# Patient Record
Sex: MALE | Race: WHITE | Employment: STUDENT | URBAN - METROPOLITAN AREA
[De-identification: names, ages, dates, MRNs, and addresses within clinical notes are randomized per-mention and may not be internally consistent; named-entity substitution may affect disease eponyms.]

---

## 2021-04-09 ENCOUNTER — TELEPHONE (OUTPATIENT)
Dept: NEUROLOGY | Facility: CLINIC | Age: 21
End: 2021-04-09

## 2021-04-09 ENCOUNTER — OFFICE VISIT (OUTPATIENT)
Dept: NEUROLOGY | Facility: CLINIC | Age: 21
End: 2021-04-09
Payer: COMMERCIAL

## 2021-04-09 VITALS
DIASTOLIC BLOOD PRESSURE: 72 MMHG | BODY MASS INDEX: 25.9 KG/M2 | WEIGHT: 185 LBS | HEIGHT: 71 IN | SYSTOLIC BLOOD PRESSURE: 118 MMHG

## 2021-04-09 DIAGNOSIS — M54.41 ACUTE RIGHT-SIDED LOW BACK PAIN WITH RIGHT-SIDED SCIATICA: ICD-10-CM

## 2021-04-09 DIAGNOSIS — M54.16 LUMBAR RADICULOPATHY: Primary | ICD-10-CM

## 2021-04-09 PROCEDURE — 3008F BODY MASS INDEX DOCD: CPT | Performed by: PHYSICAL MEDICINE & REHABILITATION

## 2021-04-09 PROCEDURE — 3078F DIAST BP <80 MM HG: CPT | Performed by: PHYSICAL MEDICINE & REHABILITATION

## 2021-04-09 PROCEDURE — 99204 OFFICE O/P NEW MOD 45 MIN: CPT | Performed by: PHYSICAL MEDICINE & REHABILITATION

## 2021-04-09 PROCEDURE — 3074F SYST BP LT 130 MM HG: CPT | Performed by: PHYSICAL MEDICINE & REHABILITATION

## 2021-04-09 RX ORDER — METHYLPREDNISOLONE 4 MG/1
TABLET ORAL
Qty: 1 PACKAGE | Refills: 0 | Status: SHIPPED | OUTPATIENT
Start: 2021-04-09

## 2021-04-09 NOTE — PATIENT INSTRUCTIONS
Plan  He will get lumbar spine flexion and extension x-rays. He will get a MRI of the lumbar spine. He will take a Medrol Dosepak. He will get into the Pt for the lumbar spine. He will let me know how he is doing next week.     He will follow

## 2021-04-09 NOTE — PROGRESS NOTES
Low Back Pain H & P    Chief Complaint:  Patient presents with:  New Patient: Lower right side back pain along with hip. Patient reports numbness on the right leg. pain is a 7/10. patient states that this pain started on wednesday . Denies injury. No meds. History      Not on file    Tobacco Use      Smoking status: Not on file    Substance and Sexual Activity      Alcohol use: Not on file      Drug use: Not on file      Sexual activity: Not on file    Other Topics      Concerns:        Not on file    Social allergies. Gait:   The patient has no difficulty walking. PE:  The patient does appear in his stated age in mild distress. The patient is well groomed. Psychiatric:  The patient is alert and oriented x 3.   The patient has a normal affect and mo test Negative for pain   LEFT hip ELIZABETH test Negative for pain   RIGHT hip internal rotation Negative for pain   LEFT hip internal rotation Negative for pain   RIGHT hip piriformis stretch test Negative for pain   LEFT hip piriformis stretch test Negative

## 2021-04-09 NOTE — TELEPHONE ENCOUNTER
[employer] for authorization of approval for MRI L-spine wo cpt code 92970. Authorization is not required. Transaction ID: [id number]. Pt. Informed.  Transferred call to scheduling for appt,

## 2021-04-13 ENCOUNTER — HOSPITAL ENCOUNTER (OUTPATIENT)
Dept: MRI IMAGING | Facility: HOSPITAL | Age: 21
Discharge: HOME | End: 2021-04-13
Attending: PHYSICAL MEDICINE & REHABILITATION
Payer: COMMERCIAL

## 2021-04-13 ENCOUNTER — HOSPITAL ENCOUNTER (OUTPATIENT)
Dept: GENERAL RADIOLOGY | Facility: HOSPITAL | Age: 21
Discharge: HOME | End: 2021-04-13
Attending: PHYSICAL MEDICINE & REHABILITATION
Payer: COMMERCIAL

## 2021-04-13 DIAGNOSIS — M54.41 ACUTE RIGHT-SIDED LOW BACK PAIN WITH RIGHT-SIDED SCIATICA: ICD-10-CM

## 2021-04-13 DIAGNOSIS — M54.16 LUMBAR RADICULOPATHY: ICD-10-CM

## 2021-04-13 PROCEDURE — 72110 X-RAY EXAM L-2 SPINE 4/>VWS: CPT | Performed by: PHYSICAL MEDICINE & REHABILITATION

## 2021-04-13 PROCEDURE — 72148 MRI LUMBAR SPINE W/O DYE: CPT | Performed by: PHYSICAL MEDICINE & REHABILITATION

## 2021-04-18 PROBLEM — M51.26 LUMBAR HERNIATED DISC: Status: ACTIVE | Noted: 2021-04-18

## 2021-04-18 PROBLEM — M48.061 SPINAL STENOSIS OF LUMBAR REGION WITHOUT NEUROGENIC CLAUDICATION: Status: ACTIVE | Noted: 2021-04-18

## 2021-04-18 PROBLEM — M51.9 LUMBAR DISC DISEASE: Status: ACTIVE | Noted: 2021-04-18

## 2021-05-25 ENCOUNTER — MED REC SCAN ONLY (OUTPATIENT)
Dept: NEUROLOGY | Facility: CLINIC | Age: 21
End: 2021-05-25

## 2021-07-07 ENCOUNTER — MED REC SCAN ONLY (OUTPATIENT)
Dept: NEUROLOGY | Facility: CLINIC | Age: 21
End: 2021-07-07

## 2021-08-30 ENCOUNTER — TELEPHONE (OUTPATIENT)
Dept: NEUROLOGY | Facility: CLINIC | Age: 21
End: 2021-08-30

## 2021-08-30 RX ORDER — IVERMECTIN 3 MG/1
TABLET ORAL
Qty: 55 TABLET | Refills: 0 | Status: SHIPPED | OUTPATIENT
Start: 2021-08-30

## 2023-05-16 ENCOUNTER — TELEPHONE (OUTPATIENT)
Dept: PHYSICAL MEDICINE AND REHAB | Facility: CLINIC | Age: 23
End: 2023-05-16

## 2023-05-16 DIAGNOSIS — M51.9 LUMBAR DISC DISEASE: ICD-10-CM

## 2023-05-16 DIAGNOSIS — M54.16 LUMBAR RADICULOPATHY: Primary | ICD-10-CM

## 2023-05-16 DIAGNOSIS — M51.26 LUMBAR HERNIATED DISC: ICD-10-CM

## 2023-05-16 DIAGNOSIS — M48.061 SPINAL STENOSIS OF LUMBAR REGION WITHOUT NEUROGENIC CLAUDICATION: ICD-10-CM

## 2023-05-16 NOTE — TELEPHONE ENCOUNTER
Patient was kneed in the left calf about one month ago or longer and has continued to have calf pain. On examination, there is weakness of the left hamstring. Left L5-S1 radiculopathy. He will get back into the PT for the lumbar spine. Please fax his PT order to [facility] and then notify the patient that the order has been sent. He will need to follow up with me in 4-6 weeks in the office.

## 2023-05-17 NOTE — TELEPHONE ENCOUNTER
PT order has been faxed via [employer] [facility] fax #: [phone]. LM on patient's VM informing him of the above. Nothing further needed at this time.

## 2025-01-15 ENCOUNTER — TELEPHONE (OUTPATIENT)
Dept: PHYSICAL MEDICINE AND REHAB | Facility: CLINIC | Age: 25
End: 2025-01-15

## 2025-01-15 ENCOUNTER — OFFICE VISIT (OUTPATIENT)
Dept: PHYSICAL MEDICINE AND REHAB | Facility: CLINIC | Age: 25
End: 2025-01-15
Payer: COMMERCIAL

## 2025-01-15 VITALS — BODY MASS INDEX: 28.7 KG/M2 | HEIGHT: 71 IN | WEIGHT: 205 LBS

## 2025-01-15 DIAGNOSIS — T14.8XXA MUSCLE TEAR: Primary | ICD-10-CM

## 2025-01-15 PROCEDURE — 3008F BODY MASS INDEX DOCD: CPT | Performed by: PHYSICAL MEDICINE & REHABILITATION

## 2025-01-15 PROCEDURE — 99204 OFFICE O/P NEW MOD 45 MIN: CPT | Performed by: PHYSICAL MEDICINE & REHABILITATION

## 2025-01-15 PROCEDURE — 76881 US COMPL JOINT R-T W/IMG: CPT | Performed by: PHYSICAL MEDICINE & REHABILITATION

## 2025-01-15 NOTE — TELEPHONE ENCOUNTER
LVMTCB and MCM sent to add on to schedule today 1/15/25 at 11am, okay to double book per Dr. Johnson request. Please schedule accordingly if pt calls back.

## 2025-01-17 ENCOUNTER — TELEPHONE (OUTPATIENT)
Dept: PHYSICAL MEDICINE AND REHAB | Facility: CLINIC | Age: 25
End: 2025-01-17

## 2025-01-17 PROBLEM — T14.8XXA MUSCLE TEAR: Status: ACTIVE | Noted: 2025-01-17

## 2025-01-17 NOTE — TELEPHONE ENCOUNTER
Please call this patient on Monday and get the fax number for his PT and also give him the number for the new sports medicine orthopedic surgeon, Dr. Terry Haynes.

## 2025-01-17 NOTE — PROCEDURES
Diagnostic Ultrasound of the left chest wall and humerus with attention to the pectoralis major muscle (comparison was made to the right side):  The humerus is intact on both sides.  The long head of the biceps tendon was intact on both sides.  On the right side we attachment of the pectoralis major tendon to the humerus was intact on the left side the attachment of the pectoralis major tendon to the humerus was very thin on axial view.  On transverse, view the pectoralis major muscle and the inferior aspect of the muscle balled up and did not attached to the humerus which was not the case on the right side.  On the superior aspect of the pectoralis major tendon on the left side, there appeared to be some scarring or weakness tendon attachments to the humerus.  The tendon was fully intact on the right side with complete attachment to the humeral head on transverse view.    The ultrasound images were saved to the ultrasound unit will be transferred to the PACS system at a later date.

## 2025-01-17 NOTE — PROGRESS NOTES
Cervical Pain H & P    Chief Complaint:    Chief Complaint   Patient presents with    New Patient     LOV 4/9/21 New patient is here with complaints of a torn lees , injury happened in September due to playing football. No N/T. Not taking any pain meds or muscle relaxer's. No recent imaging .  Reports having constant weakness . Pain 1/10       HPI:  Jean Yarbrough is a 24 year old year old right handed male without a prior history of left arm pain.  The pain started in early September when he was playing flag football on Labor Day and was tackled with his left arm fully abducted.  The left arm had a hyperextension moment at the shoulder when he hit the ground.  He developed pain in the left shoulder/chest area with bruising.  He did not seek any health care for this.  He has noticed that he has significant left arm/shoulder weakness when doing a push up or bench pressing.  He is only able to do at most 5 repetitions.  He is seeing me today to see if there is anything else that can be done for this.  He denies any other weakness and denies numbness and tingling.    Past Medical History   History reviewed. No pertinent past medical history.    Past Surgical History   History reviewed. No pertinent surgical history.    Family History   History reviewed. No pertinent family history.    Social History   Social History     Socioeconomic History    Marital status: Single     Spouse name: Not on file    Number of children: Not on file    Years of education: Not on file    Highest education level: Not on file   Occupational History    Not on file   Tobacco Use    Smoking status: Never     Passive exposure: Never    Smokeless tobacco: Never   Vaping Use    Vaping status: Never Used   Substance and Sexual Activity    Alcohol use: Never    Drug use: Never    Sexual activity: Not on file   Other Topics Concern    Caffeine Concern Yes    Exercise Yes    Seat Belt Not Asked    Special Diet Not Asked    Stress Concern Not Asked     Weight Concern Not Asked   Social History Narrative    Not on file     Social Drivers of Health     Financial Resource Strain: Not on file   Food Insecurity: Not on file   Transportation Needs: Not on file   Physical Activity: Not on file   Stress: Not on file   Social Connections: Not on file   Housing Stability: Not on file       Review of Systems  Patient-reported ROS  Constitutional  Sleep Disturbance: denies  Chills: denies  Fever: denies  Weight Gain: denies  Weight Loss: denies   Cardiovascular  Chest Pain: denies  Irregular Heartbeat: denies   Respiratory  Painful Breathing: denies  Wheezing: denies   Gastrointestinal  Bowel Incontinence: denies  Heartburn: denies  Abdominal Pain: denies  Blood in Stool : denies  Rectal Pain: denies   Hematology  Easy Bruising: denies  Easy Bleeding: denies   Genitourinary  Difficulty Urinating: denies  Bladder Incontinence: denies  Pelvic Pain: denies  Painful Urination: denies   Musculoskeletal  Joint Stiffness: denies  Painful Joints: denies  Tailbone Pain: denies  Swollen Joints: denies   Peripheral Vascular  Swelling of Legs/Feet: denies  Cold Extremities: denies   Skin  Open Sores: denies  Nodules or Lumps: denies  Rash: denies   Neurological  Loss of Strength Since last Visit: admits  Tingling/Numbness: denies  Balance: admits   Psychiatric  Anxiety: denies  Depressed Mood: denies        PE:  The patient does appear in his stated age in no distress.  The patient is well groomed.    Psychiatric:  The patient is alert and oriented x 3.  The patient has a normal affect and mood.      Respiratory:  No acute respiratory distress. Patient does not have a cough.    HEENT:  Extraocular muscles are intact. There is no kern icterus. Pupils are equal, round, and reactive to light. No redness or discharge bilaterally.    Skin:  There are no rashes or lesions.    Lymph Nodes:  The patient has no palpable submandibular, supraclavicular, and cervical lymph nodes..    Vitals:  There  were no vitals filed for this visit.    Cervical Spine:    Posture: normal chin forward superiorly rotated protracted shoulder posture.   Shoulders: Level   Head: In neutral     Vascular upper extremity:   Right radial pulses: 2+   Left radial pulses: 2+     Neurological Upper Extremity:    Light Touch: Intact in Bilateral upper extremities.   Pin Prick: Not tested.   UE Muscle Strength: All Upper Extremity strength measurements 5/5   Reflexes: 2+ In the bilateral upper extremities.   Joyce's sign Right: Negative   Joyce's sigh Left: Negative     Shoulder: The shoulders are stable.    Medial Border Scapular Winging: absent   Right Scapula: normal alignment   Left Scapula: normal alignment   Palpation: Non-tender shoulder palpations.   Right Internal Rotation: normal   Left Internal Rotation: normal   Right External Rotation: normal   Left External Rotation: normal   Shoulder Special Tests: Right and left Alcala test negative.   Palpation of the tendon of the pectoralis major muscles on the left reveals absence of the inferior fibers at a minimum.  Activation of the pectoralis major muscles reveals the same as palpation.  These findings are not present on the right side.    Assessment    ICD-10-CM    1. Muscle tear of the left pectoralis major muscle  T14.8XXA         Plan  I did a diagnostic ultrasound of the left chest wall and humerus with attention to the pectoralis major muscle. (See procedure note)    He will get a MRI of the chest with attention to the pectoral muscles.    He will get into PT for the tear.    He will see orthopedics for a possible surgical repair of the muscle and/or tendon.    He will follow up after doing one month of the PT and having the MRI scan and getting the surgical opinion.    If surgery is not an option or if he only has a partial tear on MRI, then platelet rich plasma injections might be an option for him.    The patient understands and agrees with the stated plan.    Tor NEUMANN  MD Elizabeth  1/17/2025

## 2025-01-20 NOTE — TELEPHONE ENCOUNTER
Left message to call back.   Need to get patient's PT fax number and also provide him with the below provider's information.    Terry Haynes M.D.  [address] [address]  Phone #: [phone]

## 2025-02-14 ENCOUNTER — HOSPITAL ENCOUNTER (OUTPATIENT)
Dept: MRI IMAGING | Facility: HOSPITAL | Age: 25
Discharge: HOME | End: 2025-02-14
Attending: PHYSICAL MEDICINE & REHABILITATION
Payer: COMMERCIAL

## 2025-02-14 DIAGNOSIS — T14.8XXA MUSCLE TEAR: ICD-10-CM

## 2025-02-14 PROCEDURE — 71550 MRI CHEST W/O DYE: CPT | Performed by: PHYSICAL MEDICINE & REHABILITATION

## 2025-02-17 ENCOUNTER — TELEPHONE (OUTPATIENT)
Facility: CLINIC | Age: 25
End: 2025-02-17

## 2025-02-17 NOTE — TELEPHONE ENCOUNTER
New pt appt for left bicep tear MRI in [facility] Please f/u with pt mom [phone]  Future Appointments  2/26/2025  1:00 PM    Terry Haynes MD  [facility]         [facility] [place]

## 2025-02-17 NOTE — TELEPHONE ENCOUNTER
MRI results from 2/14/25-  \"Impression   CONCLUSION:  There is a near complete oblique tear through the lateral aspect of the left pectoralis major muscle .     Left-sided gynecomastia.\"     Patient seen by Dr Johnson on 1/15/25 and per note pain started after injury in September 2024 and also had diagnostic ultrasound, results in office note.  Appointment scheduled with Dr Fleming on 2/26/25

## 2025-02-26 ENCOUNTER — OFFICE VISIT (OUTPATIENT)
Dept: ORTHOPEDICS CLINIC | Facility: CLINIC | Age: 25
End: 2025-02-26
Payer: COMMERCIAL

## 2025-02-26 DIAGNOSIS — T14.8XXA MUSCLE TEAR: Primary | ICD-10-CM

## 2025-02-26 PROCEDURE — 99205 OFFICE O/P NEW HI 60 MIN: CPT | Performed by: STUDENT IN AN ORGANIZED HEALTH CARE EDUCATION/TRAINING PROGRAM

## 2025-03-01 NOTE — PROGRESS NOTES
NURSING INTAKE COMMENTS:   Chief Complaint   Patient presents with    Injury     Left pectoral - onset in September - has an MRI in the system - has no pain -has weakness in the L arm - pt is R handed        HPI: This 24 year old male presents today after having injured his left pec major muscle, referred by Dr. Johnson.  Patient reports that he does have some weakness when performing push-ups or anything that requires strength with internal rotation of the left upper extremity.  He also notices a cosmetic deformity of the left upper extremity.  So far he has been managed nonoperatively but continues to have persistent pain and weakness.  He denies any neurovascular deficits.    History reviewed. No pertinent past medical history.  History reviewed. No pertinent surgical history.  Current Outpatient Medications   Medication Sig Dispense Refill    Ivermectin 3 MG Oral Tab Take 11 tablets daily for 5 days. 55 tablet 0    methylPREDNISolone (MEDROL) 4 MG Oral Tablet Therapy Pack Take as per package insert 1 Package 0     Allergies[1]  History reviewed. No pertinent family history.    Social History     Occupational History    Not on file   Tobacco Use    Smoking status: Never     Passive exposure: Never    Smokeless tobacco: Never   Vaping Use    Vaping status: Never Used   Substance and Sexual Activity    Alcohol use: Never    Drug use: Never    Sexual activity: Not on file        Review of Systems:  GENERAL: denies fevers, chills, night sweats, fatigue, unintentional weight loss/gain  SKIN: denies skin lesions, open sores, rash  HEENT:denies recent vision change, new nasal congestion,hearing loss, tinnitus, sore throat, headaches  RESPIRATORY: denies new shortness of breath, cough, asthma, wheezing  CARDIOVASCULAR: denies chest pain, leg cramps with exertion, palpitations, leg swelling  GI: denies abdominal pain, nausea, vomiting, diarrhea, constipation, hematochezia, worsening heartburn or stomach ulcers  : denies  dysuria, hematuria, incontinence, increased frequency, urgency, difficulty urinating  MUSCULOSKELETAL: denies musculoskeletal complaints other than in HPI  NEURO: denies numbness, tingling, weakness, balance issues, dizziness, memory loss  PSYCHIATRIC: denies Hx of depression, anxiety, other psychiatric disorders  HEMATOLOGIC: denies blood clots, anemia, blood clotting disorders, blood transfusion  ENDOCRINE: denies autoimmune disease, thyroid issues, or diabetes  ALLERGY: denies asthma, seasonal allergies    Physical Examination:    There were no vitals taken for this visit.  Constitutional: appears well hydrated, alert and responsive, no acute distress noted    Left upper extremity      Forward elevation roughly 170, abduction roughly 160, full strength with respect to supraspinatus infraspinatus teres minor and subscapularis.  Negative Randall's.  Weakness and significant cosmetic chest wall abnormality with resisted internal rotation with the arm in 90 degrees of abduction.          Imaging:   Imaging was independently reviewed and interpreted by attending physician  MRI CHEST (CPT=71550)    Result Date: 2/15/2025  PROCEDURE:  MRI CHEST (CPT=71550)  COMPARISON:  None.  INDICATIONS:  T14.8XXA Muscle tear  TECHNIQUE:  A comprehensive examination was performed utilizing a variety of imaging planes and imaging parameters to optimize visualization of suspected pathology.  PATIENT STATED HISTORY: (As transcribed by Technologist)  Pec tear 09/2024, no pain but lingering weakness    FINDINGS:  Multi planer multi echo MRI of the left chest wall was performed centered on the left pectoralis muscle.  There is an oblique near complete tear of the pectoralis major muscle laterally over a length of 4.8 cm.  The pectoralis minor muscle appears intact deep to the pectoralis major.  No significant fluid collection.  There is incidental note of gynecomastia  in the retroareolar left breast measuring 2.6 x 1.9 cm in dimension.              CONCLUSION:  There is a near complete oblique tear through the lateral aspect of the left pectoralis major muscle .  Left-sided gynecomastia.   LOCATION:  [facility]   Dictated by (CST): Adelaida Hernandez MD on 2/15/2025 at 9:10 AM     Finalized by (CST): Adelaida Hernandez MD on 2/15/2025 at 9:14 AM          Labs:  No results found for: \"WBC\", \"HGB\", \"PLT\"   No results found for: \"GLU\", \"BUN\", \"CREATSERUM\", \"GFR\", \"GFRNAA\", \"GFRAA\"     Assessment and Plan:  There are no diagnoses linked to this encounter.    Assessment: Very pleasant 24-year-old male who presents with full-thickness retracted left pectoralis major tear from September 2024.    Plan: We discussed with the patient that he does present with a full thickness sternal head pectoralis major tendon tear.  We also discussed with the patient detail that the chronicity of his injury may necessitate an allograft if we elect to repair it.  At this point there is no acuity with respect to his injury but if the does not wish to regain full strength and function he may benefit from operative intervention.  We plan to discuss final treatment with the patient on a subsequent call.    Addendum: Spoke with patient and patient would like to proceed with left pectoralis major repair versus reconstruction.    We reviewed the treatment of this disease condition, both surgical and nonsurgical. I have recommended that we proceed with surgical treatment as we agree surgical intervention would likely offer the best opportunity for symptomatic relief and functional recovery. I used diagrams, radiographic studies, and a 3-dimensional model to outline the patient's pathology, as well as general surgical principles.  In light of this, we recommend proceeding with a open left pectoralis major tendon repair versus reconstruction.     We discussed the risk and benefits of the procedure, including, but not limited to:  infection, blood loss, potential transient or permanent injury to  nerves or blood vessels, joint stiffness, persistent pain, need for future operation, failure of healing, wound complications, failure of therapeutic intervention, risk of re-injury, fracture, fixation failure, deep vein thrombosis and pulmonary embolism. We discussed the proposed rehabilitation timeline as well as expected postoperative restrictions.    The patient voiced a good understanding of treatment options, risks and benefits, postoperative instructions, rehabilitation timeline, and restrictions. The patient was given the opportunity to ask questions, which were all answered to the best of my ability and to the patient's satisfaction.    Rationale for 57 Modifier Addendum    Decision for Major Surgery  The decision for a major surgery was made during this visit/consultation based on review and discussion of the history of presentation, examination, available labs/imaging, and the patient's functional status. The medical and surgical history were considered with regards to risk and potential modifications needed.         Follow Up: No follow-ups on file.    Terry Haynes MD               [1] No Known Allergies

## 2025-03-03 ENCOUNTER — TELEPHONE (OUTPATIENT)
Dept: ORTHOPEDICS CLINIC | Facility: CLINIC | Age: 25
End: 2025-03-03

## 2025-03-03 DIAGNOSIS — T14.8XXA MUSCLE TEAR: Primary | ICD-10-CM

## 2025-03-03 NOTE — PROGRESS NOTES
Ortho Surgery Request  Surgery: Open Left Pec Major Repair vs Reconstruction      Surgical Assistant: YES  Surgery Day Request: 4/1/25  Estimated Procedure Length: 3 Hours  Anesthesia type: General + Block Interscalene Block   Position: Beach Chair   Special Needs:[]Mini C-Arm []Large C-arm  []Nurse Assist [x]SCD's [x]Surgical Assistant []Ultrasound []Ultrasound Dhara  Equipment: ACHILLES TENDON ALLOGRAFT, [employer]  Location:[facility]  Post Op Visit Date: 1 Week in [place]  CPT Code: 18311       ICD Code: Muscle tear [T14.8XXA]   Medical Clearance Needed: Not Needed  Preadmission Testing Orders:  Anesthesia testing protocols and anesthesia pre op orders will be used  Additional PAT orders:  Pre OP Orders:  Use EM procedure driven order set in addition to anesthesia protocol  Additional Pre Op Orders:  PCN Allergy:  No  If Yes:   __X__  Admit:  Hospital outpatient surgery  [facility]  No

## 2025-03-03 NOTE — TELEPHONE ENCOUNTER
Type of surgery:  Open Left Pec Major Repair vs Reconstruction   Date: 4/1/25  Location: [facility]   Medical Clearance: No      *Medical:      *Dental:      *Other:   Prior Authorization Status: Pending   Workers Comp:  Meg/Jeannie:  Benson: Yes  POV: 4/7/25

## 2025-03-31 ENCOUNTER — ANESTHESIA EVENT (OUTPATIENT)
Dept: SURGERY | Facility: HOSPITAL | Age: 25
End: 2025-03-31
Payer: COMMERCIAL

## 2025-04-01 ENCOUNTER — APPOINTMENT (OUTPATIENT)
Dept: GENERAL RADIOLOGY | Facility: HOSPITAL | Age: 25
End: 2025-04-01
Attending: STUDENT IN AN ORGANIZED HEALTH CARE EDUCATION/TRAINING PROGRAM
Payer: COMMERCIAL

## 2025-04-01 ENCOUNTER — ANESTHESIA (OUTPATIENT)
Dept: SURGERY | Facility: HOSPITAL | Age: 25
End: 2025-04-01
Payer: COMMERCIAL

## 2025-04-01 ENCOUNTER — HOSPITAL ENCOUNTER (OUTPATIENT)
Facility: HOSPITAL | Age: 25
Setting detail: HOSPITAL OUTPATIENT SURGERY
Discharge: HOME | End: 2025-04-01
Attending: STUDENT IN AN ORGANIZED HEALTH CARE EDUCATION/TRAINING PROGRAM | Admitting: STUDENT IN AN ORGANIZED HEALTH CARE EDUCATION/TRAINING PROGRAM
Payer: COMMERCIAL

## 2025-04-01 VITALS
WEIGHT: 210 LBS | BODY MASS INDEX: 29.4 KG/M2 | SYSTOLIC BLOOD PRESSURE: 144 MMHG | HEART RATE: 99 BPM | RESPIRATION RATE: 18 BRPM | TEMPERATURE: 98 F | OXYGEN SATURATION: 94 % | HEIGHT: 71 IN | DIASTOLIC BLOOD PRESSURE: 71 MMHG

## 2025-04-01 DIAGNOSIS — T14.8XXA MUSCLE TEAR: Primary | ICD-10-CM

## 2025-04-01 PROCEDURE — 0LR40KZ REPLACEMENT OF LEFT UPPER ARM TENDON WITH NONAUTOLOGOUS TISSUE SUBSTITUTE, OPEN APPROACH: ICD-10-PCS | Performed by: STUDENT IN AN ORGANIZED HEALTH CARE EDUCATION/TRAINING PROGRAM

## 2025-04-01 PROCEDURE — 73030 X-RAY EXAM OF SHOULDER: CPT | Performed by: STUDENT IN AN ORGANIZED HEALTH CARE EDUCATION/TRAINING PROGRAM

## 2025-04-01 PROCEDURE — 64415 NJX AA&/STRD BRCH PLXS IMG: CPT | Performed by: STUDENT IN AN ORGANIZED HEALTH CARE EDUCATION/TRAINING PROGRAM

## 2025-04-01 DEVICE — ICONIX 2 NEEDLES WITH INTELLIBRAID TECHNOLOGY, 2.3MM ANCHOR WITH 2 STRANDS #2 FORCE FIBER
Type: IMPLANTABLE DEVICE | Site: ARM | Status: FUNCTIONAL
Brand: ICONIX

## 2025-04-01 DEVICE — IMPLANTABLE DEVICE: Type: IMPLANTABLE DEVICE | Site: ARM | Status: FUNCTIONAL

## 2025-04-01 DEVICE — ICONIX 2 NEEDLES WITH INTELLIBRAID TECHNOLOGY, 2.3MM ANCHOR WITH 2.0MM XBRAID TT
Type: IMPLANTABLE DEVICE | Site: ARM | Status: FUNCTIONAL
Brand: ICONIX

## 2025-04-01 RX ORDER — SENNOSIDES A AND B 8.6 MG/1
1 TABLET, FILM COATED ORAL DAILY
Qty: 50 TABLET | Refills: 0 | Status: SHIPPED | OUTPATIENT
Start: 2025-04-01

## 2025-04-01 RX ORDER — HYDROMORPHONE HYDROCHLORIDE 1 MG/ML
0.6 INJECTION, SOLUTION INTRAMUSCULAR; INTRAVENOUS; SUBCUTANEOUS EVERY 5 MIN PRN
Status: DISCONTINUED | OUTPATIENT
Start: 2025-04-01 | End: 2025-04-01

## 2025-04-01 RX ORDER — LIDOCAINE HYDROCHLORIDE 10 MG/ML
INJECTION, SOLUTION EPIDURAL; INFILTRATION; INTRACAUDAL; PERINEURAL AS NEEDED
Status: DISCONTINUED | OUTPATIENT
Start: 2025-04-01 | End: 2025-04-01 | Stop reason: SURG

## 2025-04-01 RX ORDER — MIDAZOLAM HYDROCHLORIDE 1 MG/ML
INJECTION INTRAMUSCULAR; INTRAVENOUS
Status: COMPLETED | OUTPATIENT
Start: 2025-04-01 | End: 2025-04-01

## 2025-04-01 RX ORDER — HYDROMORPHONE HYDROCHLORIDE 1 MG/ML
0.8 INJECTION, SOLUTION INTRAMUSCULAR; INTRAVENOUS; SUBCUTANEOUS EVERY 2 HOUR PRN
Status: CANCELLED | OUTPATIENT
Start: 2025-04-01

## 2025-04-01 RX ORDER — MORPHINE SULFATE 2 MG/ML
2 INJECTION, SOLUTION INTRAMUSCULAR; INTRAVENOUS EVERY 10 MIN PRN
Status: DISCONTINUED | OUTPATIENT
Start: 2025-04-01 | End: 2025-04-01

## 2025-04-01 RX ORDER — OXYCODONE HYDROCHLORIDE 5 MG/1
10 TABLET ORAL EVERY 4 HOURS PRN
Status: CANCELLED | OUTPATIENT
Start: 2025-04-01

## 2025-04-01 RX ORDER — CELECOXIB 200 MG/1
200 CAPSULE ORAL DAILY
Qty: 30 CAPSULE | Refills: 0 | Status: SHIPPED | OUTPATIENT
Start: 2025-04-01

## 2025-04-01 RX ORDER — ROCURONIUM BROMIDE 10 MG/ML
INJECTION, SOLUTION INTRAVENOUS AS NEEDED
Status: DISCONTINUED | OUTPATIENT
Start: 2025-04-01 | End: 2025-04-01 | Stop reason: SURG

## 2025-04-01 RX ORDER — PROCHLORPERAZINE EDISYLATE 5 MG/ML
5 INJECTION INTRAMUSCULAR; INTRAVENOUS EVERY 8 HOURS PRN
Status: DISCONTINUED | OUTPATIENT
Start: 2025-04-01 | End: 2025-04-01

## 2025-04-01 RX ORDER — ASPIRIN 325 MG
325 TABLET, DELAYED RELEASE (ENTERIC COATED) ORAL DAILY
Qty: 30 TABLET | Refills: 0 | Status: SHIPPED | OUTPATIENT
Start: 2025-04-01

## 2025-04-01 RX ORDER — ROPIVACAINE HYDROCHLORIDE 5 MG/ML
INJECTION, SOLUTION EPIDURAL; INFILTRATION; PERINEURAL
Status: COMPLETED | OUTPATIENT
Start: 2025-04-01 | End: 2025-04-01

## 2025-04-01 RX ORDER — SODIUM CHLORIDE, SODIUM LACTATE, POTASSIUM CHLORIDE, CALCIUM CHLORIDE 600; 310; 30; 20 MG/100ML; MG/100ML; MG/100ML; MG/100ML
INJECTION, SOLUTION INTRAVENOUS CONTINUOUS
Status: DISCONTINUED | OUTPATIENT
Start: 2025-04-01 | End: 2025-04-01

## 2025-04-01 RX ORDER — HYDROMORPHONE HYDROCHLORIDE 1 MG/ML
0.4 INJECTION, SOLUTION INTRAMUSCULAR; INTRAVENOUS; SUBCUTANEOUS EVERY 5 MIN PRN
Status: DISCONTINUED | OUTPATIENT
Start: 2025-04-01 | End: 2025-04-01

## 2025-04-01 RX ORDER — HYDROMORPHONE HYDROCHLORIDE 1 MG/ML
0.2 INJECTION, SOLUTION INTRAMUSCULAR; INTRAVENOUS; SUBCUTANEOUS EVERY 5 MIN PRN
Status: DISCONTINUED | OUTPATIENT
Start: 2025-04-01 | End: 2025-04-01

## 2025-04-01 RX ORDER — ACETAMINOPHEN 500 MG
1000 TABLET ORAL ONCE
Status: COMPLETED | OUTPATIENT
Start: 2025-04-01 | End: 2025-04-01

## 2025-04-01 RX ORDER — DEXAMETHASONE SODIUM PHOSPHATE 10 MG/ML
INJECTION, SOLUTION INTRAMUSCULAR; INTRAVENOUS
Status: COMPLETED | OUTPATIENT
Start: 2025-04-01 | End: 2025-04-01

## 2025-04-01 RX ORDER — OXYCODONE HYDROCHLORIDE 5 MG/1
5 TABLET ORAL EVERY 4 HOURS PRN
Status: CANCELLED | OUTPATIENT
Start: 2025-04-01

## 2025-04-01 RX ORDER — OXYCODONE HYDROCHLORIDE 5 MG/1
5 TABLET ORAL EVERY 4 HOURS PRN
Qty: 25 TABLET | Refills: 0 | Status: SHIPPED | OUTPATIENT
Start: 2025-04-01

## 2025-04-01 RX ORDER — ONDANSETRON 2 MG/ML
4 INJECTION INTRAMUSCULAR; INTRAVENOUS EVERY 6 HOURS PRN
Status: CANCELLED | OUTPATIENT
Start: 2025-04-01

## 2025-04-01 RX ORDER — ONDANSETRON 2 MG/ML
INJECTION INTRAMUSCULAR; INTRAVENOUS AS NEEDED
Status: DISCONTINUED | OUTPATIENT
Start: 2025-04-01 | End: 2025-04-01 | Stop reason: SURG

## 2025-04-01 RX ORDER — HYDROMORPHONE HYDROCHLORIDE 1 MG/ML
0.4 INJECTION, SOLUTION INTRAMUSCULAR; INTRAVENOUS; SUBCUTANEOUS EVERY 2 HOUR PRN
Status: CANCELLED | OUTPATIENT
Start: 2025-04-01

## 2025-04-01 RX ORDER — TRANEXAMIC ACID 100 MG/ML
INJECTION, SOLUTION INTRAVENOUS AS NEEDED
Status: DISCONTINUED | OUTPATIENT
Start: 2025-04-01 | End: 2025-04-01 | Stop reason: SURG

## 2025-04-01 RX ORDER — MORPHINE SULFATE 10 MG/ML
6 INJECTION, SOLUTION INTRAMUSCULAR; INTRAVENOUS EVERY 10 MIN PRN
Status: DISCONTINUED | OUTPATIENT
Start: 2025-04-01 | End: 2025-04-01

## 2025-04-01 RX ORDER — ONDANSETRON 4 MG/1
4 TABLET, FILM COATED ORAL EVERY 8 HOURS PRN
Qty: 20 TABLET | Refills: 0 | Status: SHIPPED | OUTPATIENT
Start: 2025-04-01

## 2025-04-01 RX ORDER — TRAMADOL HYDROCHLORIDE 50 MG/1
50 TABLET ORAL EVERY 6 HOURS SCHEDULED
Status: CANCELLED | OUTPATIENT
Start: 2025-04-01

## 2025-04-01 RX ORDER — NALOXONE HYDROCHLORIDE 0.4 MG/ML
0.08 INJECTION, SOLUTION INTRAMUSCULAR; INTRAVENOUS; SUBCUTANEOUS AS NEEDED
Status: DISCONTINUED | OUTPATIENT
Start: 2025-04-01 | End: 2025-04-01

## 2025-04-01 RX ORDER — MORPHINE SULFATE 4 MG/ML
4 INJECTION, SOLUTION INTRAMUSCULAR; INTRAVENOUS EVERY 10 MIN PRN
Status: DISCONTINUED | OUTPATIENT
Start: 2025-04-01 | End: 2025-04-01

## 2025-04-01 RX ORDER — ONDANSETRON 2 MG/ML
4 INJECTION INTRAMUSCULAR; INTRAVENOUS EVERY 6 HOURS PRN
Status: DISCONTINUED | OUTPATIENT
Start: 2025-04-01 | End: 2025-04-01

## 2025-04-01 RX ORDER — DEXAMETHASONE SODIUM PHOSPHATE 4 MG/ML
VIAL (ML) INJECTION AS NEEDED
Status: DISCONTINUED | OUTPATIENT
Start: 2025-04-01 | End: 2025-04-01 | Stop reason: SURG

## 2025-04-01 RX ORDER — TRAMADOL HYDROCHLORIDE 50 MG/1
50 TABLET ORAL EVERY 6 HOURS PRN
Qty: 20 TABLET | Refills: 0 | Status: SHIPPED | OUTPATIENT
Start: 2025-04-01

## 2025-04-01 RX ADMIN — ROCURONIUM BROMIDE 30 MG: 10 INJECTION, SOLUTION INTRAVENOUS at 08:58:00

## 2025-04-01 RX ADMIN — ROCURONIUM BROMIDE 20 MG: 10 INJECTION, SOLUTION INTRAVENOUS at 10:05:00

## 2025-04-01 RX ADMIN — ROPIVACAINE HYDROCHLORIDE 30 ML: 5 INJECTION, SOLUTION EPIDURAL; INFILTRATION; PERINEURAL at 07:16:00

## 2025-04-01 RX ADMIN — SODIUM CHLORIDE, SODIUM LACTATE, POTASSIUM CHLORIDE, CALCIUM CHLORIDE: 600; 310; 30; 20 INJECTION, SOLUTION INTRAVENOUS at 07:25:00

## 2025-04-01 RX ADMIN — ROCURONIUM BROMIDE 20 MG: 10 INJECTION, SOLUTION INTRAVENOUS at 11:02:00

## 2025-04-01 RX ADMIN — ROCURONIUM BROMIDE 50 MG: 10 INJECTION, SOLUTION INTRAVENOUS at 07:37:00

## 2025-04-01 RX ADMIN — DEXAMETHASONE SODIUM PHOSPHATE 4 MG: 4 MG/ML VIAL (ML) INJECTION at 07:48:00

## 2025-04-01 RX ADMIN — SODIUM CHLORIDE, SODIUM LACTATE, POTASSIUM CHLORIDE, CALCIUM CHLORIDE: 600; 310; 30; 20 INJECTION, SOLUTION INTRAVENOUS at 11:43:00

## 2025-04-01 RX ADMIN — TRANEXAMIC ACID 1000 MG: 100 INJECTION, SOLUTION INTRAVENOUS at 07:46:00

## 2025-04-01 RX ADMIN — MIDAZOLAM HYDROCHLORIDE 2 MG: 1 INJECTION INTRAMUSCULAR; INTRAVENOUS at 07:16:00

## 2025-04-01 RX ADMIN — DEXAMETHASONE SODIUM PHOSPHATE 4 MG: 10 INJECTION, SOLUTION INTRAMUSCULAR; INTRAVENOUS at 07:16:00

## 2025-04-01 RX ADMIN — TRANEXAMIC ACID 1000 MG: 100 INJECTION, SOLUTION INTRAVENOUS at 11:10:00

## 2025-04-01 RX ADMIN — ONDANSETRON 4 MG: 2 INJECTION INTRAMUSCULAR; INTRAVENOUS at 07:48:00

## 2025-04-01 RX ADMIN — LIDOCAINE HYDROCHLORIDE 50 MG: 10 INJECTION, SOLUTION EPIDURAL; INFILTRATION; INTRACAUDAL; PERINEURAL at 07:37:00

## 2025-04-01 NOTE — ANESTHESIA PROCEDURE NOTES
Peripheral Block    Date/Time: 4/1/2025 7:16 AM    Performed by: Trev Muhammad MD  Authorized by: Trev Muhammad MD      General Information and Staff    Start Time:  4/1/2025 7:16 AM  End Time:  4/1/2025 7:21 AM  Anesthesiologist:  Trev Muhammad MD  Performed by:  Anesthesiologist  Patient Location:  PACU    Block Placement: Pre Induction  Site Identification: real time ultrasound guided and image stored and retrievable    Block site/laterality marked before start: site marked  Reason for Block: at surgeon's request and post-op pain management    Preanesthetic Checklist: 2 patient identifers, IV checked, risks and benefits discussed, monitors and equipment checked, pre-op evaluation, timeout performed, anesthesia consent, sterile technique used, no prohibitive neurological deficits and no local skin infection at insertion site      Procedure Details    Patient Position:  Supine  Prep: ChloraPrep    Monitoring:  Heart rate, cardiac monitor, continuous pulse ox and blood pressure cuff  Block Type:  Interscalene  Laterality:  Left  Injection Technique:  Single-shot    Needle    Needle Type:  Short-bevel and echogenic  Needle Gauge:  22 G  Needle Length:  50 mm  Needle Localization:  Ultrasound guidance  Reason for Ultrasound Use: appropriate spread of the medication was noted in real time and no ultrasound evidence of intravascular and/or intraneural injection            Assessment    Injection Assessment:  Good spread noted, incremental injection, low pressure, negative aspiration for heme, negative resistance and local visualized surrounding nerve on ultrasound  - Patient tolerated block procedure well without evidence of immediate block related complications.     Medications  4/1/2025 7:16 AM  midazolam (VERSED)injection 2mg/2ml - Intravenous   2 mg - 4/1/2025 7:16:00 AM  ropivacaine (NAROPIN) injection 0.5% - Infiltration   30 mL - 4/1/2025 7:16:00 AM  dexamethasone (DECADRON) PF injection  10 mg/ml - Injection   4 mg - 4/1/2025 7:16:00 AM    Additional Comments    Good image, atraumatic

## 2025-04-01 NOTE — DISCHARGE INSTRUCTIONS
Patient Discharge Instructions  Pectoralis Major Repair      WEIGHT BEARING: You should not bear weight on your operative shoulder at first. You will be immobilized in a sling to protect your shoulder after surgery. This will typically be for a period of 4-6 weeks. See post-op therapy protocol timeline for additional details.     RANGE OF MOTION: Range of Motion about the shoulder should progress slowly and only as pain allows. You should not extend the elbow or actively flex it against resistance or gravity. Working on motion is important to avoid post-operative stiffness. You may remove the sling to shower, get dressed, and do your exercises. When the sling is off, you may let your arm hang straight down at the side. While your arm is hanging down, you are encouraged to bend your elbow open and closed to prevent stiffness. You may do this while seated or standing.    DIET:    Begin with bland foods (e.g., saltines, bread) and clear liquids (e.g., ginger ale, juice, water) immediately after surgery.  You may progress to normal diet if you are feeling nauseated  Nausea and vomiting are common after surgery.  Remain on clear liquids and bland diet until this passes.  This will typically resolve within the first 24 hours after surgery.  You have been provided a nausea medication which may help.  Should your nausea persist more than 24 hours despite this medication, contact your physician.    PAIN & PAIN MEDICATIONS:   The Anesthesiologist may have performed a Nerve Block, if you had agreed to it before the surgery. A combination of local anesthetics (numbing medicines) are used to numb your shoulder and arm so your brain will not receive any pain signals during and immediately after surgery.  The length of effect varies from person to person, but the nerve block usually provides 12-24 hours of pain relief.  You will notice a gradual increase in pain as this begins to wear off. You may feel tingling, burning, electric  shocks, pins and needles or many other strange sensations as your arm is \"waking up\". You may want to take a pain medication pill before the nerve block completely wears off. People usually start to get their feeling back before they have return of ability to move their elbow, wrist and hand.  For many people, post-operative pain can be managed with simple measures, such as elevation of the operative extremity above the level of the heart, cryotherapy and ice, compression, etc.   In addition to these measures, we have prescribed pain medications. To minimize narcotic use and establish better pain control, we employ a multimodal pain approach. This protocol combines the use of scheduled acetaminophen, gabapentin, and narcotics.  We will often use anti-inflammatories (NSAID's such as ibuprofen, celecoxib, and/or naproxen) to further assist with pain control thus allowing for a lower dose of narcotic to be used. Dosing of medications will vary from patient to patient based on their needs and past medical history, so please refer to your discharge medication list.  Opiate pain medications (oxycodone) will only be refilled after an in-person visit. For all other medication refills, please contact us using the contact information below    You have been prescribed:    Acetaminophen (Tylenol) 500mg - Take 2 tabs by mouth every 8 hours for pain. Do not take more than 4000mg each day. Tylenol should be taken as a first-line, scheduled pain medication. You should only stop taking this once you have no pain or so little pain as to not need any medications. If you have liver problems, please discontinue and notify your surgeon.    Celecoxib (Celebrex) 100mg or 200mg (dosage will be determined by your age, weight, and/or kidney function) - Take 1 tablet by mouth every 12 hours for pain. NSAIDs (Ibuprofen, Naproxen, Celecoxib) should be taken as a first-line pain medication like acetaminophen. NSAIDs are non-steroidal  anti-inflammatory medications. They reduce inflammation in the affected area. You should also plan to take this until you have essentially no pain. You may wean down to just celecoxib or just acetaminophen prior to completely discontinuing medicines, and we do not have a strong recommendation of which one to stop last. Please take NSAIDs as prescribed, with food, to avoid stomach ulcers. If you have a history of stomach ulcers or develop GI bleeding, please discontinue and notify your surgeon.        Oxycodone IR 5mg - Take one tablet by mouth as often as every 4 hours as needed for breakthrough pain. Take this medication as your breakthrough pain medication, after maximizing other pain medications. Opiate pain medications (Oxycodone, Hydrocodone, Oxycontin) are prescribed as a second-line pain medication for moderate to severe post-operative pain. Opiates are associated with dependency and addiction if taken for a prolonged period. For this reason, it is best to use opiates ONLY as needed.    Ondansetron (Zofran) 4mg - Dissolve 1 tab under your tongue as often as every 8 hours as needed for nausea and/or vomiting. You do not need to swallow this medication.     Senna-Docusate 8.6mg-50mg - Take 2 tabs by mouth every evening for constipation prevention. Constipation is common after surgery and while taking pain medications. When taking this medication, you should be having a bowel movement every 2-3 days.  If not, then proceed with over-the-counter laxatives (Docusate/Miralax), enemas, or suppositories to fix the constipation.  All of these are over the counter and can be discussed in more detail with your pharmacist. If you are having multiple bowel movements daily, you should discontinue this medication.    Cholecalciferol (Vitamin D3) 5000IU - Take 1 tab by mouth daily for 3 months after surgery. This is helpful to protect your other bones from losing bone mineral density and may encourage bone healing. We  typically recommend taking the full 3-month course. It may be beneficial to continue vitamin D supplementation long term to promote your bone health.    Example Medication Schedule  Medication Morning   0600 Mid-Morning   1000 Early Afternoon   1400 Late Afternoon   1800 Evening   2200   Acetaminophen * x  x  x   Celecoxib * x    x   Oxycodone  If needed If needed If needed If needed If needed   Ondansetron   If needed  If needed    Senna-Docusate *     x   Vitamin D ^ x       Ice  x x x x x   *Take as scheduled until essentially no pain (or until loose bowel movements for senna-docusate)  ^Take/use as scheduled until out  If no marking, you should use as needed    ICE PACK/CRYOTHERAPY: Use frequently over the next 7-10 days.  Ice bags/packs/bladder should be used for 20-30 minutes every 3-4 hours during waking hours. Protect your skin from frostbite with a washcloth, towel, or ace wrap between the ice bag/pack and your skin.     DRESSINGS/WOUND CARE:   You may remove your shoulder dressings after three to four days. There is a tape and/or purple glue underneath and this may have a small amount of blood in it. This is normal. Do not remove the tape or glue - they will eventually come off on their own  Follow the bathing instructions below.   If you have increased drainage, incision redness, foul smell, or fevers - inform the office.  You may need to be evaluated in the office earlier than your follow-up appointment.    BATHING:   You should keep your surgical site clean and dry when possible.   Do not peel off the glue/tape or scrub your wound site. You may allow warm soapy water to wash over the wound.  Do not soak the wound/incision, use hot tubs or swimming pools, oceans, lakes, ponds until 4 weeks after surgery   Following these guidelines will help avoid any wound healing issues and/or infections.    PHYSICAL/OCCUPATIONAL THERAPY (PT/OT): To maximize surgical benefit, PT/OT is necessary. If you do not have an  appointment, you should contact PT/OT to set up an appointment AS SOON AS POSSIBLE. If you have problems setting up PT, please contact our nursing team.    HOME EXERCISES  You are encouraged to begin home exercises the next day after surgery and continue them for 4 weeks  This will be IN ADDITION TO your therapy protocol  Perform 15 repetitions of each exercise, 3-5 times per day, or more, depending on your level of discomfort.  Keep your arm against your stomach, resting on your leg, or hanging at your side.  Do not rotate or lift your arm away from the body or behind the body.  ELBOW MOTION  Begin next day after surgery  Remove sling and allow arm to rest at your side (you may perform this sitting or standing).  Allow your arm to straighten at the side, then gently bend elbow up.   If you had a biceps tenodesis performed, you may use your other arm to gently support the wrist and passively bend/extend the elbow.    HAND AND WRIST MOTION  Begin next day after surgery  Can be performed while arm is in sling  Curl the fingers into the palm to make a tight fist and then straighten them out.  Move the wrist up and down as far as you can tolerate to prevent stiffness.                      PENDULUM EXERCISES  Begin 7-10 days after surgery  Bend forward at the waist, using a table for support. Rock your body in a circular pattern to move arm clockwise 10-15 times and then counter-clockwise. If you are unable to rock your body to make arm circles, you may rock back and forth and then side to side.  Your goal is to make small circles, no larger than 12 inches in diameter                                                                    DVT PREVENTION:   Deep vein thrombosis (DVT) or blood clots sometimes occur following surgery. It is important to understand the signs/symptoms and methods to avoid DVTs.   Symptoms of DVT include swelling, throbbing and cramping in the extremity, swollen veins that are hard or sore. DVTs  can travel to the lungs and cause a pulmonary embolus (PE) and death. Symptoms of a pulmonary embolus include chest pain and shortness of breath. If you experience any of these symptoms you should contact the clinic immediately and/or go to the nearest emergency room.   To prevent blood clots, you should move your extremities and joints, limited by the restrictions above. Perform foot pumps, ankle circles, leg lifts, etc. to circulate blood in the extremity.   If you have been prescribed Aspirin, please take it as prescribed for DVT prophylaxis. If you plan to travel in a car or plane for long periods (> 1 hour), contact your surgeon regarding the need for DVT prophylaxis. If you have a history of blood clots, and have not been prescribed a medication, contact your surgeon immediately.     DRIVING: Driving after your surgery is dependent on several factors. You may not drive if you are taking opiate pain medications. You may not drive if you're physically unable to steer with 2 hands and press the brake with full force. If you are unsure whether you are safe to drive, you should visit your local DMV. We are not medically or legally responsible for an accident caused by unsafe driving.     POST-OPERATIVE APPOINTMENT: Your first post-operative appointment is scheduled for 10-14 days after the procedure. If you do not have an appointment scheduled yet, please contact our office. Our phone number is [phone]    SPECIAL INSTRUCTIONS: If you experience fevers greater than 100.4°F on two consecutive measurements or any fever over 102°F, chest pain, difficulty breathing, confusion, or an allergic reaction, return to your nearest emergency department as soon as possible. You may also call the emergency department to help you determine if you should come in. [facility] Emergency Department phone number is [phone].     CONTACT INFORMATION: For any questions or concerns, please contact our nursing staff. You  may also contact your surgeon via [employer].                HOME INSTRUCTIONS  AMBSURG HOME CARE INSTRUCTIONS: POST-OP ANESTHESIA  The medication that you received for sedation or general anesthesia can last up to 24 hours. Your judgment and reflexes may be altered, even if you feel like your normal self.      We Recommend:   Do not drive any motor vehicle or bicycle   Avoid mowing the lawn, playing sports, or working with power tools/applicances (power saws, electric knives or mixers)   That you have someone stay with you on your first night home   Do not drink alcohol or take sleeping pills or tranquilizers   Do not sign legal documents within 24 hours of your procedure   If you had a nerve block for your surgery, take extra care not to put any pressure on your arm or hand for 24 hours    It is normal:  For you to have a sore throat if you had a breathing tube during surgery (while you were asleep!). The sore throat should get better within 48 hours. You can gargle with warm salt water (1/2 tsp in 4 oz warm water) or use a throat lozenge for comfort  To feel muscle aches or soreness especially in the abdomen, chest or neck. The achy feeling should go away in the next 24 hours  To feel weak, sleepy or \"wiped out\". Your should start feeling better in the next 24 hours.   To experience mild discomforts such as sore lip or tongue, headache, cramps, gas pains or a bloated feeling in your abdomen.   To experience mild back pain or soreness for a day or two if you had spinal or epidural anesthesia.   If you had laparoscopic surgery, to feel shoulder pain or discomfort on the day of surgery.   For some patients to have nausea after surgery/anesthesia    If you feel nausea or experience vomiting:   Try to move around less.   Eat less than usual or drink only liquids until the next morning   Nausea should resolve in about 24 hours    If you have a problem when you are at home:    Call your surgeons office   Discharge  Instructions: After Your Surgery  You’ve just had surgery. During surgery, you were given medicine called anesthesia to keep you relaxed and free of pain. After surgery, you may have some pain or nausea. This is common. Here are some tips for feeling better and getting well after surgery.   Going home  Your healthcare provider will show you how to take care of yourself when you go home. They'll also answer your questions. Have an adult family member or friend drive you home. For the first 24 hours after your surgery:   Don't drive or use heavy equipment.  Don't make important decisions or sign legal papers.  Take medicines as directed.  Don't drink alcohol.  Have someone stay with you, if needed. They can watch for problems and help keep you safe.  Be sure to go to all follow-up visits with your healthcare provider. And rest after your surgery for as long as your provider tells you to.   Coping with pain  If you have pain after surgery, pain medicine will help you feel better. Take it as directed, before pain becomes severe. Also, ask your healthcare provider or pharmacist about other ways to control pain. This might be with heat, ice, or relaxation. And follow any other instructions your surgeon or nurse gives you.      Stay on schedule with your medicine.     Tips for taking pain medicine  To get the best relief possible, remember these points:   Pain medicines can upset your stomach. Taking them with a little food may help.  Most pain relievers taken by mouth need at least 20 to 30 minutes to start to work.  Don't wait till your pain becomes severe before you take your medicine. Try to time your medicine so that you can take it before starting an activity. This might be before you get dressed, go for a walk, or sit down for dinner.  Constipation is a common side effect of some pain medicines. Call your healthcare provider before taking any medicines such as laxatives or stool softeners to help ease constipation.  Also ask if you should skip any foods. Drinking lots of fluids and eating foods such as fruits and vegetables that are high in fiber can also help. Remember, don't take laxatives unless your surgeon has prescribed them.  Drinking alcohol and taking pain medicine can cause dizziness and slow your breathing. It can even be deadly. Don't drink alcohol while taking pain medicine.  Pain medicine can make you react more slowly to things. Don't drive or run machinery while taking pain medicine.  Your healthcare provider may tell you to take acetaminophen to help ease your pain. Ask them how much you're supposed to take each day. Acetaminophen or other pain relievers may interact with your prescription medicines or other over-the-counter (OTC) medicines. Some prescription medicines have acetaminophen and other ingredients in them. Using both prescription and OTC acetaminophen for pain can cause you to accidentally overdose. Read the labels on your OTC medicines with care. This will help you to clearly know the list of ingredients, how much to take, and any warnings. It may also help you not take too much acetaminophen. If you have questions or don't understand the information, ask your pharmacist or healthcare provider to explain it to you before you take the OTC medicine.   Managing nausea  Some people have an upset stomach (nausea) after surgery. This is often because of anesthesia, pain, or pain medicine, less movement of food in the stomach, or the stress of surgery. These tips will help you handle nausea and eat healthy foods as you get better. If you were on a special food plan before surgery, ask your healthcare provider if you should follow it while you get better. Check with your provider on how your eating should progress. It may depend on the surgery you had. These general tips may help:   Don't push yourself to eat. Your body will tell you when to eat and how much.  Start off with clear liquids and soup. They're  easier to digest.  Next try semi-solid foods as you feel ready. These include mashed potatoes, applesauce, and gelatin.  Slowly move to solid foods. Don’t eat fatty, rich, or spicy foods at first.  Don't force yourself to have 3 large meals a day. Instead eat smaller amounts more often.  Take pain medicines with a small amount of solid food, such as crackers or toast. This helps prevent nausea.  When to call your healthcare provider  Call your healthcare provider right away if you have any of these:   You still have too much pain, or the pain gets worse, after taking the medicine. The medicine may not be strong enough. Or there may be a complication from the surgery.  You feel too sleepy, dizzy, or groggy. The medicine may be too strong.  Side effects such as nausea or vomiting. Your healthcare provider may advise taking other medicines to .  Skin changes such as rash, itching, or hives. This may mean you have an allergic reaction. Your provider may advise taking other medicines.  The incision looks different (for instance, part of it opens up).  Bleeding or fluid leaking from the incision site, and weren't told to expect that.  Fever of 100.4°F (38°C) or higher, or as directed by your provider.  Call 911  Call 911 right away if you have:   Trouble breathing  Facial swelling    If you have obstructive sleep apnea   You were given anesthesia medicine during surgery to keep you comfortable and free of pain. After surgery, you may have more apnea spells because of this medicine and other medicines you were given. The spells may last longer than normal.    At home:  Keep using the continuous positive airway pressure (CPAP) device when you sleep. Unless your healthcare provider tells you not to, use it when you sleep, day or night. CPAP is a common device used to treat obstructive sleep apnea.  Talk with your provider before taking any pain medicine, muscle relaxants, or sedatives. Your provider will tell you about the  possible dangers of taking these medicines.  Contact your provider if your sleeping changes a lot even when taking medicines as directed.  [employer] last reviewed this educational content on 10/1/2021  © 8072-5661 [employer]. All rights reserved. This information is not intended as a substitute for professional medical care. Always follow your healthcare professional's instructions.

## 2025-04-01 NOTE — ANESTHESIA POSTPROCEDURE EVALUATION
Patient: Jean Yarbrough    Procedure Summary       Date: 04/01/25 Room / Location: [facility] MAIN OR 05 / [facility] MAIN OR    Anesthesia Start: 0730 Anesthesia Stop: 1205    Procedure: Open left pectoralis major reconstruction (Left: Upper Arm) Diagnosis:       Muscle tear      (Muscle tear [T14.8XXA])    Surgeons: Terry Haynes MD Anesthesiologist: Nicole Deleon MD    Anesthesia Type: general, regional ASA Status: 1            Anesthesia Type: general, regional    Vitals Value Taken Time   /81 04/01/25 1203   Temp 97.9 04/01/25 1206   Pulse 102 04/01/25 1205   Resp 26 04/01/25 1205   SpO2 98 % 04/01/25 1205   Vitals shown include unfiled device data.    [facility] AN Post Evaluation:   Patient Evaluated in PACU  Patient Participation: waiting for patient participation  Level of Consciousness: awake  Pain Score: 0  Pain Management: adequate  Airway Patency:patent  Yes    Nausea/Vomiting: none  Cardiovascular Status: acceptable  Respiratory Status: face mask and acceptable  Postoperative Hydration acceptable  97.9    Jose Martin Roberson CRNA  4/1/2025 12:06 PM

## 2025-04-01 NOTE — PLAN OF CARE
Pt arrived to Post-op SDS department. Arrived via cart by OR transport. Here with mom and called into room after Pt assessment completed. Encouraged to drink and eat and void. All belongings at bedside.     Postop sign and held orders reviewed, released and followed.     Pt in no acute distress. All questions and needs acknowledged and addressed to satisfaction. Pt given call light and aware to call with needs.

## 2025-04-01 NOTE — H&P
[facility]  part of [facility]    History & Physical    Jean Yarbrough Patient Status:  Hospital Outpatient Surgery    2000 MRN [id number]   Location [facility] OPERATING ROOM Attending Terry Haynes MD   Hosp Day # 0 PCP No primary care provider on file.     Date:  2025  Date of Admission:  2025    History provided by:patient  Chief Complaint:   No chief complaint on file.      HPI:   Jean Yarbrough is a(n) 24 year old male. Torn Left Pec    History     Past Medical History:    Visual impairment    glasses     Past Surgical History:   Procedure Laterality Date    Other surgical history Left     elbow fracture     History reviewed. No pertinent family history.  Social History:  Social History     Socioeconomic History    Marital status: Single   Tobacco Use    Smoking status: Never     Passive exposure: Never    Smokeless tobacco: Never   Vaping Use    Vaping status: Never Used   Substance and Sexual Activity    Alcohol use: Not Currently     Comment: socially    Drug use: Never   Other Topics Concern    Caffeine Concern Yes    Exercise Yes     Allergies/Medications:   Allergies: Allergies[1]  No medications prior to admission.       Review of Systems:   Pertinent items are noted in HPI.    Physical Exam:   Vital Signs:  Blood pressure 151/87, pulse 78, temperature 97.3 °F (36.3 °C), temperature source Oral, resp. rate 19, height 5' 11\" (1.803 m), weight 210 lb (95.3 kg), SpO2 98%.     General appearance: alert, appears stated age and cooperative  Extremities: Torn Left Pec  Pulses: 2+ and symmetric  Neurologic: Alert and oriented X 3, normal strength and tone. Normal symmetric reflexes. Normal coordination and gait        Results:   No results found for: \"WBC\", \"HGB\", \"HCT\", \"PLT\", \"CREATSERUM\", \"BUN\", \"NA\", \"K\", \"CL\", \"CO2\", \"GLU\", \"CA\", \"ALB\", \"ALKPHO\", \"BILT\", \"TP\", \"AST\", \"ALT\", \"PTT\", \"INR\", \"PT\", \"T4F\", \"TSH\", \"TSHREFLEX\", \"NICKY\", \"LIP\", \"GGT\", \"PSA\",  \"DDIMER\", \"ESRML\", \"ESRPF\", \"CRP\", \"BNP\", \"MG\", \"PHOS\", \"TROP\", \"CK\", \"CKMB\", \"MANJINDER\", \"RPR\", \"B12\", \"ETOH\", \"POCGLU\"    No results found.        Assessment/Plan:     * No active hospital problems. *      Terry Haynes MD  4/1/2025        [1] No Known Allergies

## 2025-04-01 NOTE — BRIEF OP NOTE
Pre-Operative Diagnosis: Muscle tear [T14.8XXA]     Post-Operative Diagnosis: Muscle tear [T14.8XXA]      Procedure Performed:   Open left pectoralis major reconstruction    Surgeons and Role:     * Terry Haynes MD - Primary    Assistant(s):  Surgical Assistant.: Jose Martin Adams     Surgical Findings: Torn Left Pec Tendon     Specimen: None     Estimated Blood Loss: No data recorded    Dictation Number:  Pending    Terry Haynes MD  4/1/2025  11:18 AM

## 2025-04-01 NOTE — ANESTHESIA PROCEDURE NOTES
Airway  Date/Time: 4/1/2025 7:41 AM  Urgency: elective    Airway not difficult    General Information and Staff    Patient location during procedure: OR  Anesthesiologist: Nicole Deleon MD  Resident/CRNA: Jose Martin Roberson CRNA  Performed: CRNA   Performed by: Jose Martin Roberson CRNA  Authorized by: Nicole Deleon MD      Indications and Patient Condition  Indications for airway management: anesthesia  Spontaneous Ventilation: absent  Sedation level: deep  Preoxygenated: yes  Patient position: sniffing  MILS maintained throughout  Mask difficulty assessment: 1 - vent by mask    Final Airway Details  Final airway type: endotracheal airway      Successful airway: ETT  Cuffed: yes   Successful intubation technique: Video laryngoscopy  Facilitating devices/methods: intubating stylet  Endotracheal tube insertion site: oral  Blade size: #4  ETT size (mm): 7.5    Cormack-Lehane Classification: grade I - full view of glottis  Placement verified by: capnometry   Measured from: teeth  Number of attempts at approach: 1  Ventilation between attempts: none  Number of other approaches attempted: 0

## 2025-04-01 NOTE — ANESTHESIA PREPROCEDURE EVALUATION
Anesthesia PreOp Note    HPI:     Jean Yarbrough is a 24 year old male who presents for preoperative consultation requested by: Terry Haynes MD    Date of Surgery: 4/1/2025    Procedure(s):  Open left pectoralis major repair versus reconstruction  Indication: Muscle tear [T14.8XXA]    Relevant Problems   No relevant active problems       NPO:  Last Liquid Consumption Date: 03/31/25  Last Liquid Consumption Time: 1900  Last Solid Consumption Date: 03/31/25  Last Solid Consumption Time: 1200  Last Liquid Consumption Date: 03/31/25          History Review:  Patient Active Problem List    Diagnosis Date Noted    Muscle tear of the left pectoralis major muscle 01/17/2025    L4-5 right mild-moderate lateral recess herniated disc 04/18/2021    L3-4 left slight-mild bulging disc 04/18/2021    L4-5 right moderate lateral recess & right mild foraminal stenosis 04/18/2021    left L5 radiculopathy 04/09/2021    Acute right-sided low back pain with right-sided sciatica 04/09/2021       Past Medical History:    Visual impairment    glasses       Past Surgical History:   Procedure Laterality Date    Other surgical history Left     elbow fracture       Prescriptions Prior to Admission[1]  Current Medications and Prescriptions Ordered in Epic[2]    Allergies[3]    History reviewed. No pertinent family history.  Social History     Socioeconomic History    Marital status: Single   Tobacco Use    Smoking status: Never     Passive exposure: Never    Smokeless tobacco: Never   Vaping Use    Vaping status: Never Used   Substance and Sexual Activity    Alcohol use: Not Currently     Comment: socially    Drug use: Never   Other Topics Concern    Caffeine Concern Yes    Exercise Yes       Available pre-op labs reviewed.             Vital Signs:  Body mass index is 29.29 kg/m².   height is 1.803 m (5' 11\") and weight is 95.3 kg (210 lb). His oral temperature is 97.3 °F (36.3 °C). His blood pressure is 142/87 and his pulse is 65. His  respiration is 20 and oxygen saturation is 99%.   Vitals:    03/29/25 1222 04/01/25 0601   BP:  142/87   Pulse:  65   Resp:  20   Temp:  97.3 °F (36.3 °C)   TempSrc:  Oral   SpO2:  99%   Weight: 95.3 kg (210 lb) 95.3 kg (210 lb)   Height: 1.803 m (5' 11\") 1.803 m (5' 11\")        Anesthesia Evaluation     Patient summary reviewed and Nursing notes reviewed    Airway   Mallampati: I  TM distance: >3 FB  Neck ROM: full  Dental      Pulmonary - normal exam   Cardiovascular - normal exam  Exercise tolerance: good    Neuro/Psych      GI/Hepatic/Renal      Endo/Other    Abdominal                  Anesthesia Plan:   ASA:  1  Plan:   General  Airway:  ETT  Post-op Pain Management: IV analgesics and Interscalene block  Informed Consent Plan and Risks Discussed With:  Patient  Discussed plan with:  Surgeon      I have informed Jean Yarbrough and/or legal guardian or family member of the nature of the anesthetic plan, benefits, risks including possible dental damage if relevant, major complications, and any alternative forms of anesthetic management.   All of the patient's questions were answered to the best of my ability. The patient desires the anesthetic management as planned.  Nicole Deleon MD  4/1/2025 7:20 AM  Present on Admission:  **None**           [1]   No medications prior to admission.   [2]   Current Facility-Administered Medications Ordered in Epic   Medication Dose Route Frequency Provider Last Rate Last Admin    lactated ringers infusion   Intravenous Continuous Terry Haynes MD 20 mL/hr at 04/01/25 0618 New Bag at 04/01/25 0618    ceFAZolin (Ancef) 2g in 10mL IV syringe premix  2 g Intravenous Once Terry Haynes MD        lactated ringers infusion   Intravenous Continuous Nicole Deleon MD        lactated ringers IV bolus 500 mL  500 mL Intravenous Once PRN Nicole Deleon MD        atropine 0.1 MG/ML injection 0.5 mg  0.5 mg Intravenous PRN Nicole Deleon MD        naloxone (Narcan) 0.4 MG/ML  injection 0.08 mg  0.08 mg Intravenous PRN Nicole Deleon MD        fentaNYL (Sublimaze) 50 mcg/mL injection 25 mcg  25 mcg Intravenous Q5 Min PRN Nicole Deleon MD        fentaNYL (Sublimaze) 50 mcg/mL injection 50 mcg  50 mcg Intravenous Q5 Min PRN Nicole Deleon MD        HYDROmorphone (Dilaudid) 1 MG/ML injection 0.2 mg  0.2 mg Intravenous Q5 Min PRN Nicole Deleon MD        HYDROmorphone (Dilaudid) 1 MG/ML injection 0.4 mg  0.4 mg Intravenous Q5 Min PRN Nicole Deleon MD        HYDROmorphone (Dilaudid) 1 MG/ML injection 0.6 mg  0.6 mg Intravenous Q5 Min PRN Nicole Deleon MD        morphINE PF 2 MG/ML injection 2 mg  2 mg Intravenous Q10 Min PRN Nicole Deleon MD        morphINE PF 4 MG/ML injection 4 mg  4 mg Intravenous Q10 Min PRN Nicole Deleon MD        morphINE PF 10 MG/ML injection 6 mg  6 mg Intravenous Q10 Min PRN Nicole Deleon MD        ondansetron (Zofran) 4 MG/2ML injection 4 mg  4 mg Intravenous Q6H PRN Nicole Deleon MD        prochlorperazine (Compazine) 10 MG/2ML injection 5 mg  5 mg Intravenous Q8H PRN Nicole Deleon MD         No current Epic-ordered outpatient medications on file.   [3] No Known Allergies

## 2025-04-01 NOTE — PLAN OF CARE
Per policy- Discharge from PACU to a Nursing Unit, PACU_2.05    Nehemias Post Anesthesia Recovery Score per PACU staff= 8 @ 13:13    Patients are discharged from the PACU or recovery area when they have achieved a score of 8 or greater on the Nehemias Post Anesthesia Recovery Score for Discharge to an inpatient room or Pre-op Recovery. Patients may be discharged to CCU/PCCU with scores <8.    ACTIVITY  Able to move 4 extremities voluntarily or on command=2  Able to move 2 extremities voluntarily or on command=1  Unable to move extremities voluntarily or on command=0    RESPIRATION  Able to breathe deeply and cough freely=2  Dyspneic, limited breathing or tachypnea=1  Apneic or on mechanical ventilator=0    CIRCULATION  BP +/- 20% of preanesthetic level=2  BP +/- 20-49 % of preanesthetic level=1  BP +/- 51% of preanesthetic level=0    CONSCIOUSNESS  Fully Awake=2  Arousable on calling=1  Not responding=0    Oxygen Saturation  Able to maintain baseline room air=2  Need 02 inhalation to maintain 02 Saturation >90%=1  02 Saturation <90% even with 02 supplement=0    PAIN=none, block  Pain is at a level of 4 or below or at 50% of the incoming pain rating.      Report from Jakob CLOUD RN  \"Lizzeth\"

## 2025-04-02 NOTE — OPERATIVE REPORT
PATIENT NAME: Jean Yarbrough   DATE OF OPERATION: TODAYDATE@      PREOPERATIVE DIAGNOSIS: Left pectoralis major tendon rupture  POSTOPERATIVE DIAGNOSIS:  Left Pectoralis Major tendon rupture     PROCEDURE PERFORMED:    1.  LEFT Open Pectoralis Major Tendon Repair     STAFF SURGEON:  Terry Haynes MD   FIRST ASSISTANT: Jose Martin Adams  ANESTHESIA:  General    ANTIBIOTICS:   Ancef 2 grams.     IMPLANTS:  Implant Name Type Inv. Item Serial No.  Lot No. LRB No. Used Action   GRAFT HUM TISS FRZN ACHILLES TEND W/ BNE [facility] - [id number]  GRAFT HUM TISS FRZN ACHILLES TEND W/ BNE [facility] [id number] [employer] WD N/A Left 1 Implanted   ANCHOR SUT 2.3MM W/ TWO NDL XBRAID TT SUT - SN/A  ANCHOR SUT 2.3MM W/ TWO NDL XBRAID TT SUT N/A [employer]jah [place] [id number] Left 1 Implanted   ANCHOR SUT 2.3MM W/ TWO NDL TWO STRND NUM 2 - SN/A  ANCHOR SUT 2.3MM W/ TWO NDL TWO STRND NUM 2 N/A [place] [id number] Left 2 Implanted        COMPLICATIONS:  None.   CONDITION:  Stable to post anesthesia care unit.      INDICATION FOR PROCEDURE: Jean Yarbrough is a 24 year old male who presented with the above diagnosis after exhausting non operative management.  We discussed the risks and benefits of operative versus nonoperative management. The risks of nonoperative management specifically the risk of deformity, weakness, loss of mobility/function, and persistent pain were discussed and the patient elected to undergo operative treatment.  We discussed benefits of the surgery including return of function and cosmesis and for pain management. We discussed alternative options including nonoperative management and alternative forms of fixation. We additionally discussed the risks of surgery, which include, but are not limited to bleeding, pain, infection, damage to nerves/vessels/tendons, incomplete relief of pain, incomplete return of function, repeat injury, need for additional surgery, blood clots, and death. The patient understands  the above risks and elected to proceed.      DESCRIPTION OF THE OPERATION:  On the day of the procedure, the patient was met in the preoperative holding area where the consent form was verified and the correct patient, laterality and procedure were identified and found to be correct.  The operative extremity was marked with indelible ink and the patient was then met by the anesthesia provider, who provided a regional nerve block prior to surgery.  The patient was subsequently transferred to the operating room and placed in the supine position on the operating room table.  Once adequate anesthesia was induced and prophylactic antibiotics were infused, a tourniquet was placed high on the arm. The arm was then prepped and draped in the usual sterile fashion.  All bony prominences were well padded.     A surgical time-out was performed prior to the initiation of the surgery and the consent form was verified and the patient, laterality, and correct procedure were verified and found to be correct.      The procedure began with a roughly 6 cm incision over the anterolateral aspect of the arm.  Incision was through skin and deep dermis.  The deltopectoral interval was identified and the cephalic vein was retracted laterally.  A Katie retractor was inserted to retract the deltoid laterally and the soft tissue medially.  Abundant scar tissue was identified and remnant and atrophied pectoralis major tendon was removed.  After careful blunt dissection the muscle belly of the pectoralis major was identified and retracted medially along the anterior wall of the chest soft tissue in the anterior wall of the sternum.    The muscle belly was freed up and and there was no longer any salvageable tendon along the pectoralis major muscle.  Using combination of Allis clamps and #2 FiberWire the muscle was mobilized.  Next an Achilles tendon allograft was utilized and passed over the superficial and deep aspect of the mobilized  pectoralis major muscle.  It was secured inferiorly superiorly superficially and deep.    Finally 3 all soft tissue 2.3 mm double loaded Elijah anchors were inserted just lateral to the biceps along the humerus.  They were weaved into the pectoralis major muscle and [place] fashion and then shuttled down and secured to the bone.  Following final repair, there were found to be well-placed and secured and the previous contour of the pectoralis major muscle was restored.    The wound was copiously irrigated and closed in layered fashion.     POSTOPERATIVE INSTRUCTIONS:   - NWB  - Start PT/OT  - Discharge to home  - Pain protocol including elevation, icing, acetaminophen, NSAIDs, gabapentin, and oxycodone as needed  - Patient will follow up with me at two weeks where splint will be removed       First Assist Necessity and Involvement     For this procedure, a surgical assistant was present for, and assisted with, the entirety of the case. The surgical assistant was involved with patient positioning, prepping and draping, directly assisting with key portions of the case including retracting and/or managing instrumentation, and closing. The surgical assistant was necessary to ensure greater likelihood of a safe and efficient operation, and no Orthopaedic Surgery resident was available to operate as an assistant.

## 2025-04-03 ENCOUNTER — TELEPHONE (OUTPATIENT)
Dept: ORTHOPEDICS CLINIC | Facility: CLINIC | Age: 25
End: 2025-04-03

## 2025-04-03 DIAGNOSIS — T14.8XXA MUSCLE TEAR: Primary | ICD-10-CM

## 2025-04-07 ENCOUNTER — OFFICE VISIT (OUTPATIENT)
Dept: ORTHOPEDICS CLINIC | Facility: CLINIC | Age: 25
End: 2025-04-07
Payer: COMMERCIAL

## 2025-04-07 DIAGNOSIS — S29.011D RUPTURE OF PECTORALIS MAJOR MUSCLE, SUBSEQUENT ENCOUNTER: Primary | ICD-10-CM

## 2025-04-07 PROCEDURE — 99024 POSTOP FOLLOW-UP VISIT: CPT | Performed by: STUDENT IN AN ORGANIZED HEALTH CARE EDUCATION/TRAINING PROGRAM

## 2025-04-07 NOTE — PROGRESS NOTES
[facility]  [address] [facility] [address]  [phone]     NURSING INTAKE COMMENTS:   Chief Complaint   Patient presents with    Post-Op     1st POV- s/p L pectoralis tendon repair sx on 4/01/2025- rates pain 1/10 on and off            The following individual(s) verbally consented to be recorded using ambient AI listening technology and understand that they can each withdraw their consent to this listening technology at any point by asking the clinician to turn off or pause the recording:    Patient name: Jean Yarbrough      HPI:   History of Present Illness  Jean Yarbrough is a 24 year old male who presents for follow-up after left shoulder surgery. He is accompanied by his mother.    He is in the recovery phase following left shoulder surgery, which involved the use of an allograft tendon. Post-surgery, his pain has been 'pretty normal' and limited. The first night post-operation was the most painful, but it improved over the next two days. For the last two days, he has only been using anti-inflammatory medication and Tylenol for pain management.    He previously had physical therapy for a knee issue and a herniated disc, but is unsure if his previous therapist has experience with pectoral repairs.    He is currently in school and not working, which allows him to focus on his recovery. He lives in [place], which is relevant for arranging physical therapy sessions.        Past Medical History:    Visual impairment    glasses     Past Surgical History:   Procedure Laterality Date    Other surgical history Left     elbow fracture     Current Outpatient Medications   Medication Sig Dispense Refill    Acetaminophen 500 MG Oral Cap Take 1 capsule (500 mg total) by mouth every 4 (four) hours as needed for Fever. 100 capsule 0    traMADol 50 MG Oral Tab Take 1 tablet (50 mg total) by mouth every 6 (six) hours as needed for Pain. No alcohol or driving on this med. Stop if lethargic or hallucinating. 20  tablet 0    Cholecalciferol 125 MCG (5000 UT) Oral Tab Take 1 tablet (5,000 Units total) by mouth daily. 30 tablet 0    sennosides (SENOKOT) 8.6 MG Oral Tab Take 1 tablet (8.6 mg total) by mouth daily. 50 tablet 0    aspirin 325 MG Oral Tab EC Take 1 tablet (325 mg total) by mouth daily. 30 tablet 0    ondansetron (ZOFRAN) 4 mg tablet Take 1 tablet (4 mg total) by mouth every 8 (eight) hours as needed for Nausea. 20 tablet 0    oxyCODONE 5 MG Oral Tab Take 1 tablet (5 mg total) by mouth every 4 (four) hours as needed for Pain. 25 tablet 0    celecoxib 200 MG Oral Cap Take 1 capsule (200 mg total) by mouth daily. 30 capsule 0    Naloxone HCl 4 MG/0.1ML Nasal Liquid 4 mg by Nasal route as needed. If patient remains unresponsive, repeat dose in other nostril 2-5 minutes after first dose. 1 kit 0     Allergies[1]  No family history on file.    Social History     Occupational History    Not on file   Tobacco Use    Smoking status: Never     Passive exposure: Never    Smokeless tobacco: Never   Vaping Use    Vaping status: Never Used   Substance and Sexual Activity    Alcohol use: Not Currently     Comment: socially    Drug use: Never    Sexual activity: Not on file        Review of Systems:  GENERAL: denies fevers, chills, night sweats, fatigue, unintentional weight loss/gain  SKIN: denies skin lesions, open sores, rash  HEENT:denies recent vision change, new nasal congestion,hearing loss, tinnitus, sore throat, headaches  RESPIRATORY: denies new shortness of breath, cough, asthma, wheezing  CARDIOVASCULAR: denies chest pain, leg cramps with exertion, palpitations, leg swelling  GI: denies abdominal pain, nausea, vomiting, diarrhea, constipation, hematochezia, worsening heartburn or stomach ulcers  : denies dysuria, hematuria, incontinence, increased frequency, urgency, difficulty urinating  MUSCULOSKELETAL: denies musculoskeletal complaints other than in HPI  NEURO: denies numbness, tingling, weakness, balance issues,  dizziness, memory loss  PSYCHIATRIC: denies Hx of depression, anxiety, other psychiatric disorders  HEMATOLOGIC: denies blood clots, anemia, blood clotting disorders, blood transfusion  ENDOCRINE: denies autoimmune disease, thyroid issues, or diabetes  ALLERGY: denies asthma, seasonal allergies    Physical Examination:    There were no vitals taken for this visit.    Physical Exam      Constitutional: appears well hydrated, alert and responsive, no acute distress noted    Left shoulder exam  Incisions well-healed no erythema no ecchymosis.  Dermabond in place.  Range of motion deferred due to patient comfort and postoperative rehab protocol          Imaging:     Results          Imaging was independently reviewed and interpreted by attending physician  XR SHOULDER, COMPLETE (MIN 2 VIEWS), LEFT (CPT=73030)    Result Date: 4/4/2025  PROCEDURE: XR SHOULDER, COMPLETE (MIN 2 VIEWS), LEFT (CPT=73030)  COMPARISON: None.  INDICATIONS: instrument count. Flat plate in OR 5.  TECHNIQUE: 3 views were obtained.   FINDINGS/CONCLUSION:          Tiny 2 mm metallic foreign body projects within the medial soft tissues of the proximal left humerus near the axilla.  There are foci of postprocedural soft tissue gas surrounding the left axilla.  No acute shoulder or proximal humerus fractures are seen.  Joint spaces are well maintained.    elm-remote  Dictated by (CST): Obey Brown MD on 4/04/2025 at 2:33 PM     Finalized by (CST): Obey Brown MD on 4/04/2025 at 2:34 PM             Labs:  No results found for: \"WBC\", \"HGB\", \"PLT\"   No results found for: \"GLU\", \"BUN\", \"CREATSERUM\", \"GFR\", \"GFRNAA\", \"GFRAA\"     Assessment and Plan:  Assessment & Plan  Left shoulder pectoralis major tendon repair  Successful repair with robust allograft. Early postoperative phase with limited pain. Anticipated recovery includes 95% pre-injury strength in six to seven months, good range of motion and strength by three months, full recovery by six  months. Emphasized not rushing recovery.  - Schedule follow-up in five weeks to assess recovery and discuss easing restrictions.  - Arrange for a physical therapy group to contact him, considering proximity.  - Advise showering directly over incision, allowing glue to come off naturally.  - Instruct to avoid rushing recovery and focus on healing.    Herniated disc  Previously managed by a physical therapist who has moved. Considering options for continued care.  - Consider finding a new physical therapist closer to his location if needed.      There are no diagnoses linked to this encounter.        Follow Up: No follow-ups on file.          Terry Haynes MD Orthopedic Surgery / Sports Medicine Specialist  [facility]  [address] [place] [address]  [email or web address]    t: [phone] f: [phone]    This note was dictated using Dragon software.  While it was briefly proofread prior to completion, some grammatical, spelling, and word choice errors due to dictation may still occur.       [1] No Known Allergies

## 2025-04-14 ENCOUNTER — TELEPHONE (OUTPATIENT)
Dept: ORTHOPEDICS CLINIC | Facility: CLINIC | Age: 25
End: 2025-04-14

## 2025-04-14 NOTE — TELEPHONE ENCOUNTER
Charlotte from [facility] called stating patient showed up to PT with no sling on and said he thought he only had to wear it for 2 weeks. PT has that patient needs to wear it for 6 weeks. PT told patient to wear the sling until confirmed with doctor. Please advise

## 2025-04-14 NOTE — TELEPHONE ENCOUNTER
Please advise on sling. I see mention of removing it to do ROM exercises in the AVS, but should he be wearing it at other times and for how long.

## 2025-05-05 ENCOUNTER — OFFICE VISIT (OUTPATIENT)
Dept: ORTHOPEDICS CLINIC | Facility: CLINIC | Age: 25
End: 2025-05-05
Payer: COMMERCIAL

## 2025-05-05 DIAGNOSIS — S40.252A: Primary | ICD-10-CM

## 2025-05-05 DIAGNOSIS — L08.9: Primary | ICD-10-CM

## 2025-05-05 RX ORDER — CEPHALEXIN 500 MG/1
500 CAPSULE ORAL 4 TIMES DAILY
Qty: 40 CAPSULE | Refills: 0 | Status: SHIPPED | OUTPATIENT
Start: 2025-05-05

## 2025-05-05 NOTE — PROGRESS NOTES
[place] - ORTHOPEDICS  [address] [facility] [address]  [phone]     NURSING INTAKE COMMENTS:   Chief Complaint   Patient presents with    Post-Op     S/p L shoulder pectoralis tendon repair f/u - had sx on 4/1/25 - states for the past 2 days has an infection on the sx site - has a bulge, redness , draining - has some pain rated as 1/10 on and off - has numbness at the sx site - he is with the sling on             The following individual(s) verbally consented to be recorded using ambient AI listening technology and understand that they can each withdraw their consent to this listening technology at any point by asking the clinician to turn off or pause the recording:    Patient name: Jean Yarbrough          HPI:   History of Present Illness  Jean Yarbrough is a 24 year old male who presents with drainage from a surgical site under his armpit.    He began experiencing drainage from a surgical site under his armpit this morning. The drainage is localized to the armpit area and is not occurring elsewhere. He describes the drainage as not too bad, but notes that it leaked this morning.    The scab came off on Thursday morning, and by Friday, he noticed a greenish discharge. He started using probiotics on the area, but it flared up significantly on Saturday. By Sunday, the drainage was more pronounced. The area is tender to touch and somewhat numb. The drainage is primarily from under the armpit and not from the main surgical site. The anchors from the surgery are in the bone and not near the skin.    He is currently undergoing physical therapy, which is going well. His strength is returning, particularly his  strength, and he is able to lift his arm a little despite some tightness. No fever or chills associated with the drainage. He reports numbness at the wound site when touched.        Past Medical History[1]  Past Surgical History[2]  Current Medications[3]  Allergies[4]  Family History[5]    Social History      Occupational History    Not on file   Tobacco Use    Smoking status: Never     Passive exposure: Never    Smokeless tobacco: Never   Vaping Use    Vaping status: Never Used   Substance and Sexual Activity    Alcohol use: Not Currently     Comment: socially    Drug use: Never    Sexual activity: Not on file        Review of Systems:  GENERAL: denies fevers, chills, night sweats, fatigue, unintentional weight loss/gain  SKIN: denies skin lesions, open sores, rash  HEENT:denies recent vision change, new nasal congestion,hearing loss, tinnitus, sore throat, headaches  RESPIRATORY: denies new shortness of breath, cough, asthma, wheezing  CARDIOVASCULAR: denies chest pain, leg cramps with exertion, palpitations, leg swelling  GI: denies abdominal pain, nausea, vomiting, diarrhea, constipation, hematochezia, worsening heartburn or stomach ulcers  : denies dysuria, hematuria, incontinence, increased frequency, urgency, difficulty urinating  MUSCULOSKELETAL: denies musculoskeletal complaints other than in HPI  NEURO: denies numbness, tingling, weakness, balance issues, dizziness, memory loss  PSYCHIATRIC: denies Hx of depression, anxiety, other psychiatric disorders  HEMATOLOGIC: denies blood clots, anemia, blood clotting disorders, blood transfusion  ENDOCRINE: denies autoimmune disease, thyroid issues, or diabetes  ALLERGY: denies asthma, seasonal allergies    Physical Examination:    There were no vitals taken for this visit.    Physical Exam  CHEST: Tenderness in the chest wall on palpation.  MUSCULOSKELETAL: Pectoral muscle strength improving.  SKIN: Pus present in the wound under the armpit.    Constitutional: appears well hydrated, alert and responsive, no acute distress noted          Imaging:     Results          Imaging was independently reviewed and interpreted by attending physician  No results found.     Labs:  No results found for: \"WBC\", \"HGB\", \"PLT\"   No results found for: \"GLU\", \"BUN\", \"CREATSERUM\",  \"GFR\", \"GFRNAA\", \"GFRAA\"     Assessment and Plan:  Assessment & Plan  Left shoulder pectoralis major tendon repair  Postoperative recovery progressing well with improving strength and intact tendon. Expected numbness at wound site. Good  and pectoral strength, lifting up to 65 pounds.  - Continue physical therapy.  - Encourage use of cast for comfort, allowing breaks as tolerated.    Postoperative wound infection  Superficial wound infection with drainage under axilla. No surgical intervention needed. Expected improvement with local wound care and oral antibiotics.  - Administer oral antibiotics (Keflex 500 mg) four times daily for ten days.  - Provide gauze and Ace wrap for local wound care to allow drainage.  - Monitor for signs of worsening infection: increased drainage, fever, chills.  - Schedule follow-up in ten days to reassess wound.  - Call office immediately if symptoms worsen.  - Avoid closing wound to allow drainage.      Diagnoses and all orders for this visit:    Foreign body of left shoulder with infection    Other orders  -     cephALEXin 500 MG Oral Cap; Take 1 capsule (500 mg total) by mouth 4 (four) times daily.          Follow Up: No follow-ups on file.          Terry Haynes MD Orthopedic Surgery / Sports Medicine Specialist  [facility]  [facility] [address]  [email or web address]    t: [phone] f: [phone]    This note was dictated using Dragon software.  While it was briefly proofread prior to completion, some grammatical, spelling, and word choice errors due to dictation may still occur.       [1]   Past Medical History:   Visual impairment    glasses   [2]   Past Surgical History:  Procedure Laterality Date    Other surgical history Left     elbow fracture   [3]   Current Outpatient Medications   Medication Sig Dispense Refill    cephALEXin 500 MG Oral Cap Take 1 capsule (500 mg total) by mouth 4 (four) times daily. 40 capsule 0    Acetaminophen  500 MG Oral Cap Take 1 capsule (500 mg total) by mouth every 4 (four) hours as needed for Fever. 100 capsule 0    traMADol 50 MG Oral Tab Take 1 tablet (50 mg total) by mouth every 6 (six) hours as needed for Pain. No alcohol or driving on this med. Stop if lethargic or hallucinating. 20 tablet 0    sennosides (SENOKOT) 8.6 MG Oral Tab Take 1 tablet (8.6 mg total) by mouth daily. 50 tablet 0    aspirin 325 MG Oral Tab EC Take 1 tablet (325 mg total) by mouth daily. 30 tablet 0    ondansetron (ZOFRAN) 4 mg tablet Take 1 tablet (4 mg total) by mouth every 8 (eight) hours as needed for Nausea. 20 tablet 0    oxyCODONE 5 MG Oral Tab Take 1 tablet (5 mg total) by mouth every 4 (four) hours as needed for Pain. 25 tablet 0    celecoxib 200 MG Oral Cap Take 1 capsule (200 mg total) by mouth daily. 30 capsule 0    Naloxone HCl 4 MG/0.1ML Nasal Liquid 4 mg by Nasal route as needed. If patient remains unresponsive, repeat dose in other nostril 2-5 minutes after first dose. 1 kit 0   [4] No Known Allergies  [5] History reviewed. No pertinent family history.

## 2025-05-19 ENCOUNTER — OFFICE VISIT (OUTPATIENT)
Dept: ORTHOPEDICS CLINIC | Facility: CLINIC | Age: 25
End: 2025-05-19
Payer: COMMERCIAL

## 2025-05-19 DIAGNOSIS — S29.011D RUPTURE OF PECTORALIS MAJOR MUSCLE, SUBSEQUENT ENCOUNTER: Primary | ICD-10-CM

## 2025-05-19 PROCEDURE — 99024 POSTOP FOLLOW-UP VISIT: CPT | Performed by: STUDENT IN AN ORGANIZED HEALTH CARE EDUCATION/TRAINING PROGRAM

## 2025-05-19 NOTE — PROGRESS NOTES
[place] - ORTHOPEDICS  [address] [facility]  [address]  [phone]     NURSING INTAKE COMMENTS:   Chief Complaint   Patient presents with    Post-Op     S/p L shoulder pectoralis tendon repair f/u - had sx on 4/1/25 - pt states his shoulder is feeling better - still has some drainage - no pain - finished antibiotics             The following individual(s) verbally consented to be recorded using ambient AI listening technology and understand that they can each withdraw their consent to this listening technology at any point by asking the clinician to turn off or pause the recording:    Patient name: Jean Yarbrough          HPI:   History of Present Illness  Jean Yarbrough is a 24 year old male who presents with post-surgical wound concerns following a procedure performed on April 1st. He is accompanied by his mother.    He underwent surgery on April 1st and is experiencing issues with the surgical site. The wound has been clogged with a yellowish-red discharge. It is more clogged currently and has been leaking less since Thursday, but it starts leaking again with movement. He completed a ten-day course of Keflex on Thursday. The wound was not leaking on Thursday and Friday, but began leaking again with movement. He is concerned about a stitch that was popping out and the area bubbling up.    He experiences chills once every couple of days but denies having a fever. The wound is still firm and numb. He is concerned about the wound not staying closed and leaking, but notes that it looks more closed than it did previously.    He is currently using gauze and a saline mixture at home to clean the wound daily. The wound is almost closed and the size is getting smaller. He is continuing with his therapy exercises and reports that he is still doing the same exercises.        Past Medical History[1]  Past Surgical History[2]  Current Medications[3]  Allergies[4]  Family History[5]    Social History     Occupational History     Not on file   Tobacco Use    Smoking status: Never     Passive exposure: Never    Smokeless tobacco: Never   Vaping Use    Vaping status: Never Used   Substance and Sexual Activity    Alcohol use: Not Currently     Comment: socially    Drug use: Never    Sexual activity: Not on file        Review of Systems:  GENERAL: denies fevers, chills, night sweats, fatigue, unintentional weight loss/gain  SKIN: denies skin lesions, open sores, rash  HEENT:denies recent vision change, new nasal congestion,hearing loss, tinnitus, sore throat, headaches  RESPIRATORY: denies new shortness of breath, cough, asthma, wheezing  CARDIOVASCULAR: denies chest pain, leg cramps with exertion, palpitations, leg swelling  GI: denies abdominal pain, nausea, vomiting, diarrhea, constipation, hematochezia, worsening heartburn or stomach ulcers  : denies dysuria, hematuria, incontinence, increased frequency, urgency, difficulty urinating  MUSCULOSKELETAL: denies musculoskeletal complaints other than in HPI  NEURO: denies numbness, tingling, weakness, balance issues, dizziness, memory loss  PSYCHIATRIC: denies Hx of depression, anxiety, other psychiatric disorders  HEMATOLOGIC: denies blood clots, anemia, blood clotting disorders, blood transfusion  ENDOCRINE: denies autoimmune disease, thyroid issues, or diabetes  ALLERGY: denies asthma, seasonal allergies    Physical Examination:    There were no vitals taken for this visit.    Physical Exam  SKIN: No erythema, skin appears normal. Wound nearly closed. Under armpit wound nearly closed    Constitutional: appears well hydrated, alert and responsive, no acute distress noted          Imaging:     Results  Procedure: Wound cleaning  Description: Wound cleaned with gauze and saline. Area observed to be leaking less, with no erythema. Small amount of blood expressed. Wound nearly closed.      Imaging was independently reviewed and interpreted by attending physician  No results found.      Labs:  No results found for: \"WBC\", \"HGB\", \"PLT\"   No results found for: \"GLU\", \"BUN\", \"CREATSERUM\", \"GFR\", \"GFRNAA\", \"GFRAA\"     Assessment and Plan:  Assessment & Plan  Left shoulder pectoralis major tendon repair  Repair healing well, minimal pain, wound nearly closed, firm area not concerning.  - Continue prescribed exercises and therapy sessions.  - Reassess need for scar cream once wound fully closed.    Postoperative wound infection  Infection improving, no active infection or erythema, completed Cephalexin course, further antibiotics not indicated, wound expected to heal with care, surgical intervention risk <50%.  - Continue daily wound cleaning with saline, alcohol, and Betadine.  - Monitor for increased infection or pus formation.  - Avoid further antibiotics unless infection worsens.  - Consider surgical irrigation and cleaning if significant pus formation occurs.  - Provide clean gauze for wound care.      There are no diagnoses linked to this encounter.        Follow Up: No follow-ups on file.          Terry Haynes MD Orthopedic Surgery / Sports Medicine Specialist  [facility]  [address] [place] [address]  [email or web address]    t: [phone] f: [phone]    This note was dictated using Dragon software.  While it was briefly proofread prior to completion, some grammatical, spelling, and word choice errors due to dictation may still occur.       [1]   Past Medical History:   Visual impairment    glasses   [2]   Past Surgical History:  Procedure Laterality Date    Other surgical history Left     elbow fracture   [3]   Current Outpatient Medications   Medication Sig Dispense Refill    cephALEXin 500 MG Oral Cap Take 1 capsule (500 mg total) by mouth 4 (four) times daily. 40 capsule 0    sennosides (SENOKOT) 8.6 MG Oral Tab Take 1 tablet (8.6 mg total) by mouth daily. 50 tablet 0    aspirin 325 MG Oral Tab EC Take 1 tablet (325 mg total) by mouth daily. 30 tablet 0     celecoxib 200 MG Oral Cap Take 1 capsule (200 mg total) by mouth daily. 30 capsule 0    Naloxone HCl 4 MG/0.1ML Nasal Liquid 4 mg by Nasal route as needed. If patient remains unresponsive, repeat dose in other nostril 2-5 minutes after first dose. 1 kit 0    Acetaminophen 500 MG Oral Cap Take 1 capsule (500 mg total) by mouth every 4 (four) hours as needed for Fever. (Patient not taking: Reported on 5/19/2025) 100 capsule 0    traMADol 50 MG Oral Tab Take 1 tablet (50 mg total) by mouth every 6 (six) hours as needed for Pain. No alcohol or driving on this med. Stop if lethargic or hallucinating. (Patient not taking: Reported on 5/19/2025) 20 tablet 0    ondansetron (ZOFRAN) 4 mg tablet Take 1 tablet (4 mg total) by mouth every 8 (eight) hours as needed for Nausea. (Patient not taking: Reported on 5/19/2025) 20 tablet 0    oxyCODONE 5 MG Oral Tab Take 1 tablet (5 mg total) by mouth every 4 (four) hours as needed for Pain. (Patient not taking: Reported on 5/19/2025) 25 tablet 0   [4] No Known Allergies  [5] No family history on file.

## 2025-06-02 ENCOUNTER — PATIENT MESSAGE (OUTPATIENT)
Dept: ORTHOPEDICS CLINIC | Facility: CLINIC | Age: 25
End: 2025-06-02

## 2025-06-02 ENCOUNTER — TELEPHONE (OUTPATIENT)
Age: 25
End: 2025-06-02

## 2025-06-02 NOTE — TELEPHONE ENCOUNTER
Spoke with patient. Endorses that since 5/23 there is a blood bubble near incision. No discharge, no opening, the bubble is red, but surrounding area is not. No heat in area, no fever/chills. This has been her since 5/23/25. He sent a picture, which I am forwarding. It is the same size, has not grown. No antibiotics at this time.

## 2025-06-02 NOTE — TELEPHONE ENCOUNTER
Patient says that the infection is getting better but it swell up again. Patient states that he has a small blood bubble .  Patient would like to know what to do next.  Patient's mom said she took a picture of it if the doctor wants to see what's going on.  Please advise.

## 2025-06-09 ENCOUNTER — OFFICE VISIT (OUTPATIENT)
Dept: ORTHOPEDICS CLINIC | Facility: CLINIC | Age: 25
End: 2025-06-09
Payer: COMMERCIAL

## 2025-06-09 DIAGNOSIS — S29.011D RUPTURE OF PECTORALIS MAJOR MUSCLE, SUBSEQUENT ENCOUNTER: Primary | ICD-10-CM

## 2025-06-09 PROCEDURE — 99024 POSTOP FOLLOW-UP VISIT: CPT | Performed by: STUDENT IN AN ORGANIZED HEALTH CARE EDUCATION/TRAINING PROGRAM

## 2025-06-10 NOTE — PROGRESS NOTES
[place] - ORTHOPEDICS  [address] [facility] [address]  [phone]     NURSING INTAKE COMMENTS:   Chief Complaint   Patient presents with    Post-Op     PO L shoulder incision area - Infection going on for about a month              The following individual(s) verbally consented to be recorded using ambient AI listening technology and understand that they can each withdraw their consent to this listening technology at any point by asking the clinician to turn off or pause the recording:    Patient name: Jean Yarbrough        HPI:   History of Present Illness  Jean Yarbrough is a 24 year old male who presents with a persistent wound infection in the armpit area. He is accompanied by his mother.    He has been experiencing a persistent wound infection in the armpit area for over a month. The wound intermittently swells, bursts, and leaks a significant amount of green and yellow discharge with a slight odor. Despite the discharge, he has no significant pain or fever, although he occasionally feels 'pins and needles' sensations down his arm. The wound tends to close up and then form a bubble again, which eventually bursts.    He has been cleaning the wound with a mixture of water and hydrochlorothiazide, but not with iodine. There is some bleeding from the wound when it starts to close, although the wound is getting smaller compared to its initial size. He is concerned about the wound 'blowing up' again.    He is not currently using any antibiotics. Despite the wound, he is somewhat back to his regular life, attending school and doing physical therapy, but remains concerned about the wound's recurrence.        Past Medical History[1]  Past Surgical History[2]  Current Medications[3]  Allergies[4]  Family History[5]    Social History     Occupational History    Not on file   Tobacco Use    Smoking status: Never     Passive exposure: Never    Smokeless tobacco: Never   Vaping Use    Vaping status: Never Used   Substance  and Sexual Activity    Alcohol use: Not Currently     Comment: socially    Drug use: Never    Sexual activity: Not on file        Review of Systems:  GENERAL: denies fevers, chills, night sweats, fatigue, unintentional weight loss/gain  SKIN: denies skin lesions, open sores, rash  HEENT:denies recent vision change, new nasal congestion,hearing loss, tinnitus, sore throat, headaches  RESPIRATORY: denies new shortness of breath, cough, asthma, wheezing  CARDIOVASCULAR: denies chest pain, leg cramps with exertion, palpitations, leg swelling  GI: denies abdominal pain, nausea, vomiting, diarrhea, constipation, hematochezia, worsening heartburn or stomach ulcers  : denies dysuria, hematuria, incontinence, increased frequency, urgency, difficulty urinating  MUSCULOSKELETAL: denies musculoskeletal complaints other than in HPI  NEURO: denies numbness, tingling, weakness, balance issues, dizziness, memory loss  PSYCHIATRIC: denies Hx of depression, anxiety, other psychiatric disorders  HEMATOLOGIC: denies blood clots, anemia, blood clotting disorders, blood transfusion  ENDOCRINE: denies autoimmune disease, thyroid issues, or diabetes  ALLERGY: denies asthma, seasonal allergies    Physical Examination:    There were no vitals taken for this visit.    Physical Exam  MUSCULOSKELETAL: Pectoral muscle feels normal.  SKIN: Wound healing well, better than before. Bleeding indicates healthy tissue.    Constitutional: appears well hydrated, alert and responsive, no acute distress noted              Imaging:     Results  Procedure: Wound cleaning and debridement  Description: Cleaned the wound, debrided necrotic tissue, expressed blood, and applied iodine solution. Observed the wound for signs of infection, noting the presence of blood and absence of purulent discharge.      Imaging was independently reviewed and interpreted by attending physician  No results found.     Labs:  No results found for: \"WBC\", \"HGB\", \"PLT\"   No results  found for: \"GLU\", \"BUN\", \"CREATSERUM\", \"GFR\", \"GFRNAA\", \"GFRAA\"     Assessment and Plan:  Assessment & Plan  Postoperative wound infection  Chronic infection in left shoulder axillary region with intermittent swelling and drainage. Localized with no systemic symptoms. Conservative management preferred to avoid further surgery and antibiotic resistance.  - Continue aggressive wound cleaning with iodine solution.  - Regularly express and drain pus.  - Refer to wound care specialist if no improvement in two weeks.  - Follow-up on July 16th.  - Provide additional iodine sticks.    Left shoulder pectoralis major tendon repair  Recovery progressing well post-surgery. No significant pain, satisfactory muscle contour, no infection signs.  - Continue physical therapy as tolerated.      There are no diagnoses linked to this encounter.    \    Follow Up: No follow-ups on file.          Terry Haynes MD Orthopedic Surgery / Sports Medicine Specialist  [facility]  [address] [place] [address]  [email or web address]    t: [phone] f: [phone]    This note was dictated using Dragon software.  While it was briefly proofread prior to completion, some grammatical, spelling, and word choice errors due to dictation may still occur.       [1]   Past Medical History:   Visual impairment    glasses   [2]   Past Surgical History:  Procedure Laterality Date    Other surgical history Left     elbow fracture   [3]   Current Outpatient Medications   Medication Sig Dispense Refill    aspirin 325 MG Oral Tab EC Take 1 tablet (325 mg total) by mouth daily. 30 tablet 0    cephALEXin 500 MG Oral Cap Take 1 capsule (500 mg total) by mouth 4 (four) times daily. (Patient not taking: Reported on 6/9/2025) 40 capsule 0    Acetaminophen 500 MG Oral Cap Take 1 capsule (500 mg total) by mouth every 4 (four) hours as needed for Fever. (Patient not taking: Reported on 6/9/2025) 100 capsule 0    traMADol 50 MG Oral Tab Take 1  tablet (50 mg total) by mouth every 6 (six) hours as needed for Pain. No alcohol or driving on this med. Stop if lethargic or hallucinating. (Patient not taking: Reported on 6/9/2025) 20 tablet 0    sennosides (SENOKOT) 8.6 MG Oral Tab Take 1 tablet (8.6 mg total) by mouth daily. (Patient not taking: Reported on 6/9/2025) 50 tablet 0    ondansetron (ZOFRAN) 4 mg tablet Take 1 tablet (4 mg total) by mouth every 8 (eight) hours as needed for Nausea. (Patient not taking: Reported on 6/9/2025) 20 tablet 0    oxyCODONE 5 MG Oral Tab Take 1 tablet (5 mg total) by mouth every 4 (four) hours as needed for Pain. (Patient not taking: Reported on 6/9/2025) 25 tablet 0    celecoxib 200 MG Oral Cap Take 1 capsule (200 mg total) by mouth daily. (Patient not taking: Reported on 6/9/2025) 30 capsule 0    Naloxone HCl 4 MG/0.1ML Nasal Liquid 4 mg by Nasal route as needed. If patient remains unresponsive, repeat dose in other nostril 2-5 minutes after first dose. (Patient not taking: Reported on 6/9/2025) 1 kit 0   [4] No Known Allergies  [5] No family history on file.

## 2025-06-30 ENCOUNTER — OFFICE VISIT (OUTPATIENT)
Dept: ORTHOPEDICS CLINIC | Facility: CLINIC | Age: 25
End: 2025-06-30
Payer: COMMERCIAL

## 2025-06-30 DIAGNOSIS — S29.011D RUPTURE OF PECTORALIS MAJOR MUSCLE, SUBSEQUENT ENCOUNTER: Primary | ICD-10-CM

## 2025-06-30 PROCEDURE — 99215 OFFICE O/P EST HI 40 MIN: CPT | Performed by: STUDENT IN AN ORGANIZED HEALTH CARE EDUCATION/TRAINING PROGRAM

## 2025-06-30 NOTE — PROGRESS NOTES
[facility]  [address] [facility]  [address]  [phone]     NURSING INTAKE COMMENTS:   Chief Complaint   Patient presents with    Post-Op     POV Left Pec major repair DOS 4/01/25. No redness around incision site, has drainage sometimes and has a peice of tissue coming out of incision.             The following individual(s) verbally consented to be recorded using ambient AI listening technology and understand that they can each withdraw their consent to this listening technology at any point by asking the clinician to turn off or pause the recording:    Patient name: Jean Yarbrough        HPI:   History of Present Illness  Jean Yarbrough is a 24 year old male who presents with a persistent wound issue following pectoral reconstruction surgery. He is accompanied by his mother.    He underwent pectoral reconstruction with an Achilles allograft following a pectoral rupture sustained during flag football in September. The surgery was necessary due to significant atrophy after nonoperative management. Since the surgery, he has been experiencing issues with a small wound that has been present for two months.    The wound is located in the armpit area and is described as having tissue coming out and 'bubbling and bursting'. It began leaking on Saturday, with some tissue extrusion at that time. He describes the area as 'stuffy' and notes that the wound looks red. No fevers or chills are associated with the wound.    He has been applying iodine to the wound and inquires about the use of wound cream.        Past Medical History[1]  Past Surgical History[2]  Current Medications[3]  Allergies[4]  Family History[5]    Social History     Occupational History    Not on file   Tobacco Use    Smoking status: Never     Passive exposure: Never    Smokeless tobacco: Never   Vaping Use    Vaping status: Never Used   Substance and Sexual Activity    Alcohol use: Not Currently     Comment: socially    Drug use: Never     Sexual activity: Not on file        Review of Systems:  GENERAL: denies fevers, chills, night sweats, fatigue, unintentional weight loss/gain  SKIN: denies skin lesions, open sores, rash  HEENT:denies recent vision change, new nasal congestion,hearing loss, tinnitus, sore throat, headaches  RESPIRATORY: denies new shortness of breath, cough, asthma, wheezing  CARDIOVASCULAR: denies chest pain, leg cramps with exertion, palpitations, leg swelling  GI: denies abdominal pain, nausea, vomiting, diarrhea, constipation, hematochezia, worsening heartburn or stomach ulcers  : denies dysuria, hematuria, incontinence, increased frequency, urgency, difficulty urinating  MUSCULOSKELETAL: denies musculoskeletal complaints other than in HPI  NEURO: denies numbness, tingling, weakness, balance issues, dizziness, memory loss  PSYCHIATRIC: denies Hx of depression, anxiety, other psychiatric disorders  HEMATOLOGIC: denies blood clots, anemia, blood clotting disorders, blood transfusion  ENDOCRINE: denies autoimmune disease, thyroid issues, or diabetes  ALLERGY: denies asthma, seasonal allergies    Physical Examination:    There were no vitals taken for this visit.    Physical Exam        Constitutional: appears well hydrated, alert and responsive, no acute distress noted    LEFT Upper extremity  ROM forward elevation 120, abduction 120, 5/5 strength with external and internal rotation    1x1cm area of wound dehiscence with small area of purulent drainage in the axilla      Imaging:     Results          Imaging was independently reviewed and interpreted by attending physician  No results found.     Labs:  No results found for: \"WBC\", \"HGB\", \"PLT\"   No results found for: \"GLU\", \"BUN\", \"CREATSERUM\", \"GFR\", \"GFRNAA\", \"GFRAA\"     Assessment and Plan:  Assessment & Plan  Chronic postoperative wound infection  Chronic infection in left shoulder post-pectoralis major tendon repair, localized without systemic symptoms.  - Schedule surgical  debridement and cleaning this week.  - Perform incision, clean with Betadine and granite powder, close with nylon sutures.  - Maintain sutures for three weeks.  - Discontinue iodine use.  - Apply wound cream post-healing for cosmetic improvement.    We reviewed the treatment of this disease condition, both surgical and nonsurgical. I have recommended that we proceed with surgical treatment as we agree surgical intervention would likely offer the best opportunity for symptomatic relief and functional recovery. I used diagrams, radiographic studies, and a 3-dimensional model to outline the patient's pathology, as well as general surgical principles.  In light of this, we recommend proceeding with a Open irrigation and debridement left shoulder.     We discussed the risk and benefits of the procedure, including, but not limited to:  infection, blood loss, potential transient or permanent injury to nerves or blood vessels, joint stiffness, persistent pain, need for future operation, failure of healing, wound complications, failure of therapeutic intervention, risk of re-injury, fracture, fixation failure, deep vein thrombosis and pulmonary embolism. We discussed the proposed rehabilitation timeline as well as expected postoperative restrictions.    The patient voiced a good understanding of treatment options, risks and benefits, postoperative instructions, rehabilitation timeline, and restrictions. The patient was given the opportunity to ask questions, which were all answered to the best of my ability and to the patient's satisfaction.     Rationale for 57 Modifier Addendum    Decision for Major Surgery  The decision for a major surgery was made during this visit/consultation based on review and discussion of the history of presentation, examination, available labs/imaging, and the patient's functional status. The medical and surgical history were considered with regards to risk and potential modifications needed.     There are no diagnoses linked to this encounter.        Follow Up: No follow-ups on file.          Terry Haynes MD Orthopedic Surgery / Sports Medicine Specialist  [facility]  [address] [place] [address]  [email or web address]    t: [phone] f: [phone]    This note was dictated using Dragon software.  While it was briefly proofread prior to completion, some grammatical, spelling, and word choice errors due to dictation may still occur.         [1]   Past Medical History:   Visual impairment    glasses   [2]   Past Surgical History:  Procedure Laterality Date    Other surgical history Left     elbow fracture   [3]   Current Outpatient Medications   Medication Sig Dispense Refill    aspirin 325 MG Oral Tab EC Take 1 tablet (325 mg total) by mouth daily. 30 tablet 0    cephALEXin 500 MG Oral Cap Take 1 capsule (500 mg total) by mouth 4 (four) times daily. (Patient not taking: Reported on 6/30/2025) 40 capsule 0    Acetaminophen 500 MG Oral Cap Take 1 capsule (500 mg total) by mouth every 4 (four) hours as needed for Fever. (Patient not taking: Reported on 6/30/2025) 100 capsule 0    traMADol 50 MG Oral Tab Take 1 tablet (50 mg total) by mouth every 6 (six) hours as needed for Pain. No alcohol or driving on this med. Stop if lethargic or hallucinating. (Patient not taking: Reported on 6/30/2025) 20 tablet 0    sennosides (SENOKOT) 8.6 MG Oral Tab Take 1 tablet (8.6 mg total) by mouth daily. (Patient not taking: Reported on 6/30/2025) 50 tablet 0    ondansetron (ZOFRAN) 4 mg tablet Take 1 tablet (4 mg total) by mouth every 8 (eight) hours as needed for Nausea. (Patient not taking: Reported on 6/30/2025) 20 tablet 0    oxyCODONE 5 MG Oral Tab Take 1 tablet (5 mg total) by mouth every 4 (four) hours as needed for Pain. (Patient not taking: Reported on 6/30/2025) 25 tablet 0    celecoxib 200 MG Oral Cap Take 1 capsule (200 mg total) by mouth daily. (Patient not taking: Reported on  6/30/2025) 30 capsule 0    Naloxone HCl 4 MG/0.1ML Nasal Liquid 4 mg by Nasal route as needed. If patient remains unresponsive, repeat dose in other nostril 2-5 minutes after first dose. (Patient not taking: Reported on 6/30/2025) 1 kit 0   [4] No Known Allergies  [5] No family history on file.

## 2025-07-01 NOTE — PROGRESS NOTES
Ortho Surgery Request  Surgery: LEFT Upper extremity open irrigation and debridement      Surgical Assistant: Yes  Surgery Day Request: 7/2/25  Estimated Procedure Length: 30 Min  Anesthesia type: IV/MAC    Position: Supine   Special Needs:[]Mini C-Arm []Large C-arm  []Nurse Assist [x]SCD's [x]Surgical Assistant []Ultrasound []Ultrasound Dhara  Equipment: Pulse   Location:Main OR  Post Op Visit Date: One Week Post Op  CPT Code: 74929       ICD Code: Rupture of pectoralis major muscle, subsequent encounter [S29.011D]   Medical Clearance Needed: Not Needed  Preadmission Testing Orders:  __X__  Additional PAT orders:  Pre OP Orders:  Use [facility] procedure driven order set in addition to anesthesia protocol  Additional Pre Op Orders:  PCN Allergy:  No  If Yes:   __X__  Admit:  Hospital outpatient surgery  Home Health  No

## 2025-07-01 NOTE — DISCHARGE INSTRUCTIONS
ICE PACK: Use frequently over the next 7-10 days.  Ice bags/packs/bladder should be used for 20-30 minutes over the surgical site every 3-4 hours during waking hours. Protect your skin from frostbite with a washcloth, towel, or ace wrap between the ice bag/pack and your skin.     DRESSINGS/WOUND CARE:   You should leave your dressings in place until your postoperative visit with your surgeon.  Follow the bathing instructions below.  If you have increased drainage, incision redness, foul smell, or fevers - inform the office.  You may need to be evaluated in the office earlier than your follow-up appointment.    BATHING:   You should not get the surgical dressings wet unless you have a water-resistant dressing. While you may shower immediately, it is often easier to wait until the dressings are removed to do so. After 3-4 days, when you remove the surgical dressing, you may shower normally.  After removal of the dressing, you may allow warm soapy water to wash over the wound. Try to avoid direct water pressure aimed at your surgical site.  You should keep your surgical site clean and dry when possible until you are seen for your postoperative visit.   Do not soak the wound/incision, use hot tubs or swimming pools, oceans, lakes, ponds until 4 weeks after surgery   Following these guidelines will help avoid any wound healing issues and/or infections.    PHYSICAL/OCCUPATIONAL THERAPY (PT/OT): To maximize surgical benefit, PT/OT is necessary. If you do not have an appointment, you should contact PT/OT to set up an appointment. You should start working with the therapist immediately after surgery. If you have problems setting up PT, please contact our team.    DVT PREVENTION:   Deep vein thrombosis (DVT) or blood clots sometimes occur following surgery. It is important to understand the signs/symptoms and methods to avoid DVTs.   Symptoms of DVT include swelling, throbbing and cramping in the extremity, swollen veins that  are hard or sore. DVTs can travel to the lungs and cause a pulmonary embolus (PE) and death. Symptoms of a pulmonary embolus include chest pain and shortness of breath. If you experience any of these symptoms you should contact the clinic immediately and/or go to the nearest emergency room.   To prevent blood clots, you should move your extremities and joints, limited by the restrictions above. Perform foot pumps, ankle circles, leg lifts, etc. to circulate blood in the extremity. Moving your uninjured extremity is helpful in preventing blood clots even in your injured extremity.  If you have been prescribed an anticoagulant medication, please take it as prescribed for DVT prophylaxis. If you plan to travel in a car or plane for long periods (> 1 hour), contact your surgeon. If you have a history of blood clots, and have not been prescribed a medication, contact your surgeon immediately.     DRIVING: Driving after your surgery is dependent on several factors. You may not drive if you are taking opiate pain medications. You may not drive if you're physically unable to steer with 2 hands and press the brake with full force. It is often more difficult to return to driving if the right leg was injured. If you are unsure whether you are safe to drive, you should visit your local DMV. We are not medically or legally responsible for an accident caused by unsafe driving.     POST-OPERATIVE APPOINTMENT: Your first post-operative appointment is scheduled for 2-3 weeks after the procedure. If you do not have an appointment scheduled yet, please contact our scheduling office.    SPECIAL INSTRUCTIONS: If you experience fevers greater than 100.4°F for two consecutive days or any single temperature greater than 102.2°F, or if you experience chest pain, difficulty breathing, confusion, or an allergic reaction, return to your nearest emergency department as soon as possible.                HOME INSTRUCTIONS  [facility]  INSTRUCTIONS: POST-OP ANESTHESIA  The medication that you received for sedation or general anesthesia can last up to 24 hours. Your judgment and reflexes may be altered, even if you feel like your normal self.      We Recommend:   Do not drive any motor vehicle or bicycle   Avoid mowing the lawn, playing sports, or working with power tools/applicances (power saws, electric knives or mixers)   That you have someone stay with you on your first night home   Do not drink alcohol or take sleeping pills or tranquilizers   Do not sign legal documents within 24 hours of your procedure   If you had a nerve block for your surgery, take extra care not to put any pressure on your arm or hand for 24 hours    It is normal:  For you to have a sore throat if you had a breathing tube during surgery (while you were asleep!). The sore throat should get better within 48 hours. You can gargle with warm salt water (1/2 tsp in 4 oz warm water) or use a throat lozenge for comfort  To feel muscle aches or soreness especially in the abdomen, chest or neck. The achy feeling should go away in the next 24 hours  To feel weak, sleepy or \"wiped out\". Your should start feeling better in the next 24 hours.   To experience mild discomforts such as sore lip or tongue, headache, cramps, gas pains or a bloated feeling in your abdomen.   To experience mild back pain or soreness for a day or two if you had spinal or epidural anesthesia.   If you had laparoscopic surgery, to feel shoulder pain or discomfort on the day of surgery.   For some patients to have nausea after surgery/anesthesia    If you feel nausea or experience vomiting:   Try to move around less.   Eat less than usual or drink only liquids until the next morning   Nausea should resolve in about 24 hours    If you have a problem when you are at home:    Call your surgeons office   Discharge Instructions: After Your Surgery  You’ve just had surgery. During surgery, you were given medicine  called anesthesia to keep you relaxed and free of pain. After surgery, you may have some pain or nausea. This is common. Here are some tips for feeling better and getting well after surgery.   Going home  Your healthcare provider will show you how to take care of yourself when you go home. They'll also answer your questions. Have an adult family member or friend drive you home. For the first 24 hours after your surgery:   Don't drive or use heavy equipment.  Don't make important decisions or sign legal papers.  Take medicines as directed.  Don't drink alcohol.  Have someone stay with you, if needed. They can watch for problems and help keep you safe.  Be sure to go to all follow-up visits with your healthcare provider. And rest after your surgery for as long as your provider tells you to.   Coping with pain  If you have pain after surgery, pain medicine will help you feel better. Take it as directed, before pain becomes severe. Also, ask your healthcare provider or pharmacist about other ways to control pain. This might be with heat, ice, or relaxation. And follow any other instructions your surgeon or nurse gives you.      Stay on schedule with your medicine.     Tips for taking pain medicine  To get the best relief possible, remember these points:   Pain medicines can upset your stomach. Taking them with a little food may help.  Most pain relievers taken by mouth need at least 20 to 30 minutes to start to work.  Don't wait till your pain becomes severe before you take your medicine. Try to time your medicine so that you can take it before starting an activity. This might be before you get dressed, go for a walk, or sit down for dinner.  Constipation is a common side effect of some pain medicines. Call your healthcare provider before taking any medicines, such as laxatives or stool softeners, to help ease constipation. Also ask if you should skip any foods. Drinking lots of fluids and eating foods, such as fruits  and vegetables, that are high in fiber can also help. Remember, don't take laxatives unless your surgeon has prescribed them.  Drinking alcohol and taking pain medicine can cause dizziness and slow your breathing. It can even be deadly. Don't drink alcohol while taking pain medicine.  Pain medicine can make you react more slowly to things. Don't drive or run machinery while taking pain medicine.  Your healthcare provider may tell you to take acetaminophen to help ease your pain. Ask them how much you're supposed to take each day. Acetaminophen or other pain relievers may interact with your prescription medicines or other over-the-counter (OTC) medicines. Some prescription medicines have acetaminophen and other ingredients in them. Using both prescription and OTC acetaminophen for pain can cause you to accidentally overdose. Read the labels on your OTC medicines with care. This will help you to clearly know the list of ingredients, how much to take, and any warnings. It may also help you not take too much acetaminophen. If you have questions or don't understand the information, ask your pharmacist or healthcare provider to explain it to you before you take the OTC medicine.   Managing nausea  Some people have an upset stomach (nausea) after surgery. This is often because of anesthesia, pain, or pain medicine, less movement of food in the stomach, or the stress of surgery. These tips will help you handle nausea and eat healthy foods as you get better. If you were on a special food plan before surgery, ask your healthcare provider if you should follow it while you get better. Check with your provider on how your eating should progress. It may depend on the surgery you had. These general tips may help:   Don't push yourself to eat. Your body will tell you when to eat and how much.  Start off with clear liquids and soup. They're easier to digest.  Next try semi-solid foods as you feel ready. These include mashed  potatoes, applesauce, and gelatin.  Slowly move to solid foods. Don’t eat fatty, rich, or spicy foods at first.  Don't force yourself to have 3 large meals a day. Instead eat smaller amounts more often.  Take pain medicines with a small amount of solid food, such as crackers or toast. This helps prevent nausea.  When to call your healthcare provider  Call your healthcare provider right away if you have any of these:   You still have too much pain, or the pain gets worse, after taking the medicine. The medicine may not be strong enough. Or there may be a complication from the surgery.  You feel too sleepy, dizzy, or groggy. The medicine may be too strong.  Side effects, such as nausea or vomiting. Your healthcare provider may advise taking other medicines to treat these or may change your treatment plan..  Skin changes, such as rash, itching, or hives. This may mean you have an allergic reaction. Your provider may advise taking other medicines.  The incision looks different (for instance, part of it opens up).  Bleeding or fluid leaking from the incision site, and you weren't told to expect that.  Fever of 100.4°F (38°C) or higher, or as directed by your healthcare provider.  Call 911  Call 911 right away if you have:   Trouble breathing  Facial swelling    If you have obstructive sleep apnea   You were given anesthesia medicine during surgery to keep you comfortable and free of pain. After surgery, you may have more apnea spells because of this medicine and other medicines you were given. The spells may last longer than normal.    At home:  Keep using the continuous positive airway pressure (CPAP) device when you sleep. Unless your healthcare provider tells you not to, use it when you sleep, day or night. CPAP is a common device used to treat obstructive sleep apnea.  Talk with your provider before taking any pain medicine, muscle relaxants, or sedatives. Your provider will tell you about the possible dangers of  taking these medicines.  Contact your provider if your sleeping changes a lot even when taking medicines as directed.  [employer] last reviewed this educational content on 4/1/2024  This information is for informational purposes only. This is not intended to be a substitute for professional medical advice, diagnosis, or treatment. Always seek the advice and follow the directions from your physician or other qualified health care provider.  © 7076-1396 [employer]. All rights reserved. This information is not intended as a substitute for professional medical care. Always follow your healthcare professional's instructions.

## 2025-07-02 ENCOUNTER — HOSPITAL ENCOUNTER (OUTPATIENT)
Facility: HOSPITAL | Age: 25
Setting detail: HOSPITAL OUTPATIENT SURGERY
Discharge: HOME | End: 2025-07-02
Attending: STUDENT IN AN ORGANIZED HEALTH CARE EDUCATION/TRAINING PROGRAM | Admitting: STUDENT IN AN ORGANIZED HEALTH CARE EDUCATION/TRAINING PROGRAM
Payer: COMMERCIAL

## 2025-07-02 ENCOUNTER — ANESTHESIA (OUTPATIENT)
Dept: SURGERY | Facility: HOSPITAL | Age: 25
End: 2025-07-02
Payer: COMMERCIAL

## 2025-07-02 ENCOUNTER — ANESTHESIA EVENT (OUTPATIENT)
Dept: SURGERY | Facility: HOSPITAL | Age: 25
End: 2025-07-02
Payer: COMMERCIAL

## 2025-07-02 VITALS
HEIGHT: 71 IN | BODY MASS INDEX: 28 KG/M2 | OXYGEN SATURATION: 97 % | HEART RATE: 53 BPM | RESPIRATION RATE: 11 BRPM | DIASTOLIC BLOOD PRESSURE: 65 MMHG | WEIGHT: 200 LBS | TEMPERATURE: 98 F | SYSTOLIC BLOOD PRESSURE: 133 MMHG

## 2025-07-02 DIAGNOSIS — S29.011D RUPTURE OF PECTORALIS MAJOR MUSCLE, SUBSEQUENT ENCOUNTER: ICD-10-CM

## 2025-07-02 PROCEDURE — 87070 CULTURE OTHR SPECIMN AEROBIC: CPT | Performed by: STUDENT IN AN ORGANIZED HEALTH CARE EDUCATION/TRAINING PROGRAM

## 2025-07-02 PROCEDURE — 87075 CULTR BACTERIA EXCEPT BLOOD: CPT | Performed by: STUDENT IN AN ORGANIZED HEALTH CARE EDUCATION/TRAINING PROGRAM

## 2025-07-02 PROCEDURE — 87205 SMEAR GRAM STAIN: CPT | Performed by: STUDENT IN AN ORGANIZED HEALTH CARE EDUCATION/TRAINING PROGRAM

## 2025-07-02 RX ORDER — SODIUM CHLORIDE, SODIUM LACTATE, POTASSIUM CHLORIDE, CALCIUM CHLORIDE 600; 310; 30; 20 MG/100ML; MG/100ML; MG/100ML; MG/100ML
INJECTION, SOLUTION INTRAVENOUS CONTINUOUS
Status: DISCONTINUED | OUTPATIENT
Start: 2025-07-02 | End: 2025-07-02

## 2025-07-02 RX ORDER — NALOXONE HYDROCHLORIDE 0.4 MG/ML
0.08 INJECTION, SOLUTION INTRAMUSCULAR; INTRAVENOUS; SUBCUTANEOUS AS NEEDED
Status: DISCONTINUED | OUTPATIENT
Start: 2025-07-02 | End: 2025-07-02

## 2025-07-02 RX ORDER — ONDANSETRON 2 MG/ML
4 INJECTION INTRAMUSCULAR; INTRAVENOUS EVERY 6 HOURS PRN
Status: DISCONTINUED | OUTPATIENT
Start: 2025-07-02 | End: 2025-07-02

## 2025-07-02 RX ORDER — LIDOCAINE HYDROCHLORIDE 20 MG/ML
INJECTION, SOLUTION EPIDURAL; INFILTRATION; INTRACAUDAL; PERINEURAL AS NEEDED
Status: DISCONTINUED | OUTPATIENT
Start: 2025-07-02 | End: 2025-07-02 | Stop reason: SURG

## 2025-07-02 RX ORDER — MIDAZOLAM HYDROCHLORIDE 1 MG/ML
INJECTION INTRAMUSCULAR; INTRAVENOUS AS NEEDED
Status: DISCONTINUED | OUTPATIENT
Start: 2025-07-02 | End: 2025-07-02 | Stop reason: SURG

## 2025-07-02 RX ORDER — HYDROMORPHONE HYDROCHLORIDE 1 MG/ML
0.6 INJECTION, SOLUTION INTRAMUSCULAR; INTRAVENOUS; SUBCUTANEOUS EVERY 5 MIN PRN
Status: DISCONTINUED | OUTPATIENT
Start: 2025-07-02 | End: 2025-07-02

## 2025-07-02 RX ORDER — ACETAMINOPHEN 500 MG
1000 TABLET ORAL ONCE
Status: COMPLETED | OUTPATIENT
Start: 2025-07-02 | End: 2025-07-02

## 2025-07-02 RX ORDER — HYDROMORPHONE HYDROCHLORIDE 1 MG/ML
0.4 INJECTION, SOLUTION INTRAMUSCULAR; INTRAVENOUS; SUBCUTANEOUS EVERY 5 MIN PRN
Status: DISCONTINUED | OUTPATIENT
Start: 2025-07-02 | End: 2025-07-02

## 2025-07-02 RX ORDER — HYDROMORPHONE HYDROCHLORIDE 1 MG/ML
0.2 INJECTION, SOLUTION INTRAMUSCULAR; INTRAVENOUS; SUBCUTANEOUS EVERY 5 MIN PRN
Status: DISCONTINUED | OUTPATIENT
Start: 2025-07-02 | End: 2025-07-02

## 2025-07-02 RX ORDER — HYDROCODONE BITARTRATE AND ACETAMINOPHEN 5; 325 MG/1; MG/1
1 TABLET ORAL ONCE AS NEEDED
Status: DISCONTINUED | OUTPATIENT
Start: 2025-07-02 | End: 2025-07-02

## 2025-07-02 RX ORDER — ACETAMINOPHEN 500 MG
1000 TABLET ORAL ONCE AS NEEDED
Status: DISCONTINUED | OUTPATIENT
Start: 2025-07-02 | End: 2025-07-02

## 2025-07-02 RX ORDER — CEPHALEXIN 500 MG/1
500 CAPSULE ORAL 4 TIMES DAILY
Qty: 40 CAPSULE | Refills: 0 | Status: SHIPPED | OUTPATIENT
Start: 2025-07-02 | End: 2025-07-16 | Stop reason: ALTCHOICE

## 2025-07-02 RX ORDER — HYDROCODONE BITARTRATE AND ACETAMINOPHEN 5; 325 MG/1; MG/1
2 TABLET ORAL ONCE AS NEEDED
Status: DISCONTINUED | OUTPATIENT
Start: 2025-07-02 | End: 2025-07-02

## 2025-07-02 RX ADMIN — MIDAZOLAM HYDROCHLORIDE 1 MG: 1 INJECTION INTRAMUSCULAR; INTRAVENOUS at 12:23:00

## 2025-07-02 RX ADMIN — MIDAZOLAM HYDROCHLORIDE 3 MG: 1 INJECTION INTRAMUSCULAR; INTRAVENOUS at 12:01:00

## 2025-07-02 RX ADMIN — LIDOCAINE HYDROCHLORIDE 100 MG: 20 INJECTION, SOLUTION EPIDURAL; INFILTRATION; INTRACAUDAL; PERINEURAL at 12:01:00

## 2025-07-02 RX ADMIN — SODIUM CHLORIDE, SODIUM LACTATE, POTASSIUM CHLORIDE, CALCIUM CHLORIDE: 600; 310; 30; 20 INJECTION, SOLUTION INTRAVENOUS at 11:57:00

## 2025-07-02 NOTE — ANESTHESIA POSTPROCEDURE EVALUATION
Patient: Jean Yarbrough    Procedure Summary       Date: 07/02/25 Room / Location: [facility] MAIN OR  / [facility] MAIN OR    Anesthesia Start: 1156 Anesthesia Stop: 1313    Procedure: Left upper extremity open irrigation and debridement (Left) Diagnosis:       Rupture of pectoralis major muscle, subsequent encounter      (Rupture of pectoralis major muscle, subsequent encounter [S29.011D])    Surgeons: Terry Haynes MD Anesthesiologist: Samy Kevin DO    Anesthesia Type: MAC ASA Status: 1            Anesthesia Type: MAC    Vitals Value Taken Time   /78 07/02/25 13:23   Temp 97.4 °F (36.3 °C) 07/02/25 13:12   Pulse 63 07/02/25 13:25   Resp 9 07/02/25 13:25   SpO2 100 % 07/02/25 13:25   Vitals shown include unfiled device data.    EM AN Post Evaluation:   Patient Evaluated in PACU  Patient Participation: complete - patient participated  Level of Consciousness: awake and awake and alert  Pain Score: 0  Pain Management: adequateYes    Nausea/Vomiting: none  Cardiovascular Status: acceptable  Respiratory Status: acceptable and unassisted  Postoperative Hydration acceptable      Samy Kevin DO  7/2/2025 1:26 PM

## 2025-07-02 NOTE — ANESTHESIA PREPROCEDURE EVALUATION
Anesthesia PreOp Note    HPI:     Jean Yarbrough is a 24 year old male who presents for preoperative consultation requested by: Terry Haynes MD    Date of Surgery: 7/2/2025    Procedure(s):  Left upper extremity open irrigation and debridement  Indication: Rupture of pectoralis major muscle, subsequent encounter [S29.011D]    Relevant Problems   No relevant active problems       NPO:  Last Liquid Consumption Date: 07/01/25  Last Liquid Consumption Time: 2230  Last Solid Consumption Date: 07/01/25  Last Solid Consumption Time: 2230  Last Liquid Consumption Date: 07/01/25          History Review:  Patient Active Problem List    Diagnosis Date Noted    Muscle tear of the left pectoralis major muscle 01/17/2025    L4-5 right mild-moderate lateral recess herniated disc 04/18/2021    L3-4 left slight-mild bulging disc 04/18/2021    L4-5 right moderate lateral recess & right mild foraminal stenosis 04/18/2021    left L5 radiculopathy 04/09/2021    Acute right-sided low back pain with right-sided sciatica 04/09/2021       Past Medical History[1]    Past Surgical History[2]    Prescriptions Prior to Admission[3]  Current Medications and Prescriptions Ordered in Epic[4]    Allergies[5]    Family History[6]  Social Hx on file[7]    Available pre-op labs reviewed.             Vital Signs:  Body mass index is 27.89 kg/m².   height is 1.803 m (5' 11\") and weight is 90.7 kg (200 lb). His oral temperature is 98.2 °F (36.8 °C). His blood pressure is 144/76 and his pulse is 75. His respiration is 17 and oxygen saturation is 98%.   Vitals:    07/01/25 1015 07/02/25 1046 07/02/25 1055   BP:   144/76   Pulse:   75   Resp:   17   Temp:   98.2 °F (36.8 °C)   TempSrc:   Oral   SpO2:   98%   Weight: 93 kg (205 lb) 90.7 kg (200 lb)    Height: 1.803 m (5' 11\")          Anesthesia Evaluation     Patient summary reviewed and Nursing notes reviewed    Airway   Mallampati: I  TM distance: >3 FB  Neck ROM: full  Dental      Pulmonary -  negative ROS and normal exam   Cardiovascular - negative ROS and normal exam  Exercise tolerance: good    Neuro/Psych - negative ROS     GI/Hepatic/Renal - negative ROS     Endo/Other - negative ROS   Abdominal                  Anesthesia Plan:   ASA:  1  Plan:   MAC  Plan Comments: GA backup  Informed Consent Plan and Risks Discussed With:  Patient      I have informed Jean Yarbrough and/or legal guardian or family member of the nature of the anesthetic plan, benefits, risks including possible dental damage if relevant, major complications, and any alternative forms of anesthetic management.   All of the patient's questions were answered to the best of my ability. The patient desires the anesthetic management as planned.  Samy Kevin   2025 11:25 AM  Present on Admission:  **None**           [1]   Past Medical History:   Visual impairment    glasses   [2]   Past Surgical History:  Procedure Laterality Date    Other surgical history Left     elbow fracture    Other surgical history Left 2025    Pectoralis Tear Left Upper Arm   [3]   Medications Prior to Admission   Medication Sig Dispense Refill Last Dose/Taking    [] Cholecalciferol 125 MCG (5000 UT) Oral Tab Take 1 tablet (5,000 Units total) by mouth daily. 30 tablet 0 2025    cephALEXin 500 MG Oral Cap Take 1 capsule (500 mg total) by mouth 4 (four) times daily. (Patient not taking: Reported on 2025) 40 capsule 0     Acetaminophen 500 MG Oral Cap Take 1 capsule (500 mg total) by mouth every 4 (four) hours as needed for Fever. (Patient not taking: Reported on 2025) 100 capsule 0     traMADol 50 MG Oral Tab Take 1 tablet (50 mg total) by mouth every 6 (six) hours as needed for Pain. No alcohol or driving on this med. Stop if lethargic or hallucinating. (Patient not taking: Reported on 2025) 20 tablet 0     sennosides (SENOKOT) 8.6 MG Oral Tab Take 1 tablet (8.6 mg total) by mouth daily. (Patient not taking: Reported on  6/30/2025) 50 tablet 0     aspirin 325 MG Oral Tab EC Take 1 tablet (325 mg total) by mouth daily. 30 tablet 0     ondansetron (ZOFRAN) 4 mg tablet Take 1 tablet (4 mg total) by mouth every 8 (eight) hours as needed for Nausea. (Patient not taking: Reported on 6/30/2025) 20 tablet 0     oxyCODONE 5 MG Oral Tab Take 1 tablet (5 mg total) by mouth every 4 (four) hours as needed for Pain. (Patient not taking: Reported on 6/30/2025) 25 tablet 0     celecoxib 200 MG Oral Cap Take 1 capsule (200 mg total) by mouth daily. (Patient not taking: Reported on 6/30/2025) 30 capsule 0     Naloxone HCl 4 MG/0.1ML Nasal Liquid 4 mg by Nasal route as needed. If patient remains unresponsive, repeat dose in other nostril 2-5 minutes after first dose. (Patient not taking: Reported on 6/30/2025) 1 kit 0    [4]   Current Facility-Administered Medications Ordered in Epic   Medication Dose Route Frequency Provider Last Rate Last Admin    lactated ringers infusion   Intravenous Continuous Terry Haynes MD 20 mL/hr at 07/02/25 1055 New Bag at 07/02/25 1055    ceFAZolin (Ancef) 2g in 10mL IV syringe premix  2 g Intravenous Once Terry Haynes MD         No current [facility]-ordered outpatient medications on file.   [5] No Known Allergies  [6] History reviewed. No pertinent family history.  [7]   Social History  Socioeconomic History    Marital status: Single   Tobacco Use    Smoking status: Never     Passive exposure: Never    Smokeless tobacco: Never   Vaping Use    Vaping status: Never Used   Substance and Sexual Activity    Alcohol use: Not Currently     Comment: socially    Drug use: Never   Other Topics Concern    Caffeine Concern Yes    Exercise Yes

## 2025-07-16 ENCOUNTER — OFFICE VISIT (OUTPATIENT)
Facility: CLINIC | Age: 25
End: 2025-07-16
Payer: COMMERCIAL

## 2025-07-16 VITALS — SYSTOLIC BLOOD PRESSURE: 139 MMHG | HEART RATE: 66 BPM | DIASTOLIC BLOOD PRESSURE: 78 MMHG

## 2025-07-16 DIAGNOSIS — S29.011D RUPTURE OF PECTORALIS MAJOR MUSCLE, SUBSEQUENT ENCOUNTER: Primary | ICD-10-CM

## 2025-07-16 PROCEDURE — 3075F SYST BP GE 130 - 139MM HG: CPT | Performed by: STUDENT IN AN ORGANIZED HEALTH CARE EDUCATION/TRAINING PROGRAM

## 2025-07-16 PROCEDURE — 99212 OFFICE O/P EST SF 10 MIN: CPT | Performed by: STUDENT IN AN ORGANIZED HEALTH CARE EDUCATION/TRAINING PROGRAM

## 2025-07-16 PROCEDURE — 3078F DIAST BP <80 MM HG: CPT | Performed by: STUDENT IN AN ORGANIZED HEALTH CARE EDUCATION/TRAINING PROGRAM

## 2025-07-20 NOTE — BRIEF OP NOTE
Pre-Operative Diagnosis: Rupture of pectoralis major muscle, subsequent encounter [S29.011D]     Post-Operative Diagnosis: Rupture of pectoralis major muscle, subsequent encounter [S29.011D]      Procedure Performed:   Left upper extremity open irrigation and debridement    Surgeons and Role:     * Terry Haynes MD - Primary    Assistant(s):  Surgical Assistant.: Yecenia Shelley CSA     Surgical Findings: Superficial infection left upper extremity     Specimen: Wound culture     Estimated Blood Loss: No data recorded    Dictation Number:  Pending    Terry Haynes MD  7/20/2025  2:03 PM

## 2025-07-20 NOTE — H&P
[facility]  part of [facility]    History & Physical    Jean Yarbrough Patient Status:  Hospital Outpatient Surgery    2000 MRN [id number]   Location [facility] PRE OP RECOVERY Attending No att. providers found   Hosp Day # 0 PCP No primary care provider on file.     Date:  2025  Date of Admission:  2025    History provided by:patient  Chief Complaint:   No chief complaint on file.      HPI:   Jean Yarbrough is a(n) 24 year old male. Here for superficial wound infection    History   Past Medical History[1]  Past Surgical History[2]  Family History[3]  Social History:  Short Social Hx on File[4]  Allergies/Medications:   Allergies: Allergies[5]  Prescriptions Prior to Admission[6]    Review of Systems:   Pertinent items are noted in HPI.    Physical Exam:   Vital Signs:  Blood pressure 133/65, pulse 53, temperature 97.6 °F (36.4 °C), temperature source Temporal, resp. rate 11, height 5' 11\" (1.803 m), weight 200 lb (90.7 kg), SpO2 97%.     General appearance: alert, appears stated age and cooperative  Extremities: 1x1 area of superficial wound breakdown  Pulses: 2+ and symmetric  Neurologic: Alert and oriented X 3, normal strength and tone. Normal symmetric reflexes. Normal coordination and gait        Results:   No results found for: \"WBC\", \"HGB\", \"HCT\", \"PLT\", \"CREATSERUM\", \"BUN\", \"NA\", \"K\", \"CL\", \"CO2\", \"GLU\", \"CA\", \"ALB\", \"ALKPHO\", \"BILT\", \"TP\", \"AST\", \"ALT\", \"PTT\", \"INR\", \"PT\", \"T4F\", \"TSH\", \"TSHREFLEX\", \"NICKY\", \"LIP\", \"GGT\", \"PSA\", \"DDIMER\", \"ESRML\", \"ESRPF\", \"CRP\", \"BNP\", \"MG\", \"PHOS\", \"TROP\", \"CK\", \"CKMB\", \"MANJINDER\", \"RPR\", \"B12\", \"ETOH\", \"POCGLU\"    No results found.        Assessment/Plan:     * No active hospital problems. *      Terry Haynes MD  2025        [1]   Past Medical History:   Visual impairment    glasses   [2]   Past Surgical History:  Procedure Laterality Date    Other surgical history Left     elbow fracture    Other surgical history Left  04/01/2025    Pectoralis Tear Left Upper Arm   [3] History reviewed. No pertinent family history.  [4]   Social History  Socioeconomic History    Marital status: Single   Tobacco Use    Smoking status: Never     Passive exposure: Never    Smokeless tobacco: Never   Vaping Use    Vaping status: Never Used   Substance and Sexual Activity    Alcohol use: Not Currently     Comment: socially    Drug use: Never   Other Topics Concern    Caffeine Concern Yes    Exercise Yes   [5] No Known Allergies  [6]   No medications prior to admission.

## 2025-07-20 NOTE — OPERATIVE REPORT
PRE-OP DX: LEFT Upper Extremity superficial infection  POST-OP DX: SAME     SURGEON: Terry Haynes MD  ASSISTANT(S): Collin Shelley    PROCEDURE(S):   Irrigation and Debridement Left Upper Extremity     ANESTHESIA:  MAC    EBL: 10cc  MATERIALS:  None  BLOOD:  None  SPECIMEN(S):  None  DRAINS:  None    DISPOSITION/CONDITIONS: Stable to PACU    OPERATIVE FINDINGS: Superficial infection, left upper extremity    IMPLANTS:* No implants in log *   COMPLICATIONS:  None    INDICATIONS FOR PROCEDURE:  Jean Yarbrough is a 24 year old male who presented with the above diagnosis after open repair and reconstruction of a torn left pectoralis major tendon.  Patient reports pain and mechanical symptoms. We discussed the risks and benefits of operative versus nonoperative management. The risks of nonoperative management specifically the risk of persistent stiffness and mechanical symptoms, progression to arthritis, and pain were discussed and the patient elected to undergo operative treatment.  We discussed benefits of the surgery including return of mobility, stability, and pain management. We discussed alternative options. We additionally discussed the risks of surgery, which include, but are not limited to bleeding, pain, infection, damage to nerves/vessels, incomplete relief of pain, incomplete return of function, failure of healing, need for additional surgery, blood clots, and death. The patient understands the above risks and elected to proceed.     DESCRIPTION OF PROCEDURE:   On the day of the procedure, the patient was identified in the preoperative holding area using three identifiers and the correct operative site was verified with the patient and marked by myself.  The patient was then transferred to the operating room.  Once in the operating room, they underwent anesthesia without complications. We then placed the patient in the supine position on the operating table with all bony prominences well padded.  Preoperative antibiotics were administered within one hour of skin incision.  The extremity was prepped and draped in the usual sterile fashion.  A surgical time-out was performed using two patient identifiers with all operating room members in the operating room.    The procedure began with a roughly 4cm incision over the prior weeping wound. Beneath the subcutaneous tissue was evidence of a superficial infection without significant purulent. With a combination of a rongeur, curette and tenotomy scissors, devascularized tissue was carefully removed.    Next, the wound was copiously irrigated with a combination of saline and betadine. Following irrigation and debridement, all necrotic and infected tissue was removed.      The wound was closed with deep dermal 3-0 monocryl sutures and 3-0 Nylon sutures. The patient was transferred to the PACU in stable condition.      POSTOPERATIVE PLAN: The patient will be weightbearing as tolerated and will followup in the orthopedic clinic in approximately two weeks.       First Assist Necessity and Involvement     For this procedure, a surgical assistant was present for, and assisted with, the entirety of the case. The surgical assistant was involved with patient positioning, prepping and draping, directly assisting with key portions of the case including retracting and/or managing instrumentation, and closing. The surgical assistant was necessary to ensure greater likelihood of a safe and efficient operation, and no Orthopaedic Surgery resident was available to operate as an assistant.

## 2025-07-23 ENCOUNTER — OFFICE VISIT (OUTPATIENT)
Facility: CLINIC | Age: 25
End: 2025-07-23
Payer: COMMERCIAL

## 2025-07-23 DIAGNOSIS — S41.102D OPEN WOUND OF LEFT UPPER ARM, SUBSEQUENT ENCOUNTER: Primary | ICD-10-CM

## 2025-07-23 NOTE — PROGRESS NOTES
[facility]  [address]  [address]  [phone]     NURSING INTAKE COMMENTS:   Chief Complaint   Patient presents with    Post-Op     S/p L PM  from 4/1/25 and I&D from 7/2/25 f/u - states he still has some drainage -has a blood blister - has some minor pains on and off             The following individual(s) verbally consented to be recorded using ambient AI listening technology and understand that they can each withdraw their consent to this listening technology at any point by asking the clinician to turn off or pause the recording:    Patient name: Jean Yarbrough        HPI:   History of Present Illness  Jean Yarbrough is a 24 year old male who presents with a non-healing wound following an Achilles tendon allograft.    He has a non-healing wound at the site of his Achilles tendon allograft, which began leaking approximately two days ago. He observed a blood clot emerging from small holes in the wound, describing the tissue as part of the allograft. There is no pus, and he is concerned about possible rejection of the allograft. The wound is described as 'a little painful.'    No fevers or chills, although he mentions experiencing 'a little bit of chills' without shivering. No systemic symptoms such as a runny nose are present.    He is worried about insurance coverage as it will lapse on August 20th due to changes in his 's work coverage. He believes the current issue is still covered under the old insurance plan.    The wound has two small open holes, one of which was leaking but has since closed. The rest of the wound has healed well except for this small area, and he is concerned about the wound not healing on its own despite previous attempts.        Past Medical History[1]  Past Surgical History[2]  Current Medications[3]  Allergies[4]  Family History[5]    Social History     Occupational History    Not on file   Tobacco Use    Smoking status: Never     Passive exposure: Never     Smokeless tobacco: Never   Vaping Use    Vaping status: Never Used   Substance and Sexual Activity    Alcohol use: Not Currently     Comment: socially    Drug use: Never    Sexual activity: Not on file        Review of Systems:  GENERAL: denies fevers, chills, night sweats, fatigue, unintentional weight loss/gain  SKIN: denies skin lesions, open sores, rash  HEENT:denies recent vision change, new nasal congestion,hearing loss, tinnitus, sore throat, headaches  RESPIRATORY: denies new shortness of breath, cough, asthma, wheezing  CARDIOVASCULAR: denies chest pain, leg cramps with exertion, palpitations, leg swelling  GI: denies abdominal pain, nausea, vomiting, diarrhea, constipation, hematochezia, worsening heartburn or stomach ulcers  : denies dysuria, hematuria, incontinence, increased frequency, urgency, difficulty urinating  MUSCULOSKELETAL: denies musculoskeletal complaints other than in HPI  NEURO: denies numbness, tingling, weakness, balance issues, dizziness, memory loss  PSYCHIATRIC: denies Hx of depression, anxiety, other psychiatric disorders  HEMATOLOGIC: denies blood clots, anemia, blood clotting disorders, blood transfusion  ENDOCRINE: denies autoimmune disease, thyroid issues, or diabetes  ALLERGY: denies asthma, seasonal allergies    Physical Examination:    /78   Pulse 66     Physical Exam  SKIN: Skin is tenuous and friable.    Constitutional: appears well hydrated, alert and responsive, no acute distress noted    LEFT Shoulder Exam    Full passive and active ROM with excellent contour of pectoralis tendon. Small 1x1cm area of wound dehiscence with active drainage. No purulence.       Imaging:     Results  LABS  Culture: No growth    Procedure: Suture removal and re-suturing  Description: Sutures were removed. The area was anesthetized with lidocaine. New sutures were placed to close the open area.      Imaging was independently reviewed and interpreted by attending physician  No results  found.     Labs:  No results found for: \"WBC\", \"HGB\", \"PLT\"   No results found for: \"GLU\", \"BUN\", \"CREATSERUM\", \"GFR\", \"GFRNAA\", \"GFRAA\"     Assessment and Plan:  Assessment & Plan  Non-healing surgical wound one week post op  Surgical wound on left shoulder not healing, with open area, leakage, and blood clot. Surrounding skin friable.  - Administer lidocaine for local anesthesia.  - Suture open area to promote closure. Follow up in one week for re evaluation      There are no diagnoses linked to this encounter.        Follow Up: No follow-ups on file.          Terry Haynes MD Orthopedic Surgery / Sports Medicine Specialist  [facility]  [address] [place] [address]  [email or web address]    t: [phone] f: [phone]    This note was dictated using Dragon software.  While it was briefly proofread prior to completion, some grammatical, spelling, and word choice errors due to dictation may still occur.       [1]   Past Medical History:   Visual impairment    glasses   [2]   Past Surgical History:  Procedure Laterality Date    Other surgical history Left     elbow fracture    Other surgical history Left 04/01/2025    Pectoralis Tear Left Upper Arm   [3]   Current Outpatient Medications   Medication Sig Dispense Refill    Naloxone HCl 4 MG/0.1ML Nasal Liquid 4 mg by Nasal route as needed. If patient remains unresponsive, repeat dose in other nostril 2-5 minutes after first dose. (Patient not taking: Reported on 6/30/2025) 1 kit 0   [4] No Known Allergies  [5] History reviewed. No pertinent family history.

## 2025-07-23 NOTE — PROGRESS NOTES
[facility]  [address]  [address]  [phone]     NURSING INTAKE COMMENTS:   Chief Complaint   Patient presents with    Post-Op     S/p L PM  from 4/1/25 and I&D from 7/2/25 f/u - states he still has a little drainage , and redness - no pain -             The following individual(s) verbally consented to be recorded using ambient AI listening technology and understand that they can each withdraw their consent to this listening technology at any point by asking the clinician to turn off or pause the recording:    Patient name: Jean Yarbrough        HPI:   History of Present Illness  Jean Yarbrough is a 24 year old male who presents with a wound with a popped stitch.    He is experiencing issues with a wound that is leaking less than the previous week. He and his mother expressed concerns about the thinness of the area and asked about the long-term integrity of the wound.    He is planning to travel to [place] with his  on August 13th, visiting [place] [facility]. He wants to address any necessary medical interventions before this date.    He has been off physical therapy and is waiting before resuming. He is focusing on healing before returning to strengthening exercises.        Past Medical History[1]  Past Surgical History[2]  Current Medications[3]  Allergies[4]  Family History[5]    Social History     Occupational History    Not on file   Tobacco Use    Smoking status: Never     Passive exposure: Never    Smokeless tobacco: Never   Vaping Use    Vaping status: Never Used   Substance and Sexual Activity    Alcohol use: Not Currently     Comment: socially    Drug use: Never    Sexual activity: Not on file        Review of Systems:  GENERAL: denies fevers, chills, night sweats, fatigue, unintentional weight loss/gain  SKIN: denies skin lesions, open sores, rash  HEENT:denies recent vision change, new nasal congestion,hearing loss, tinnitus, sore throat, headaches  RESPIRATORY: denies  new shortness of breath, cough, asthma, wheezing  CARDIOVASCULAR: denies chest pain, leg cramps with exertion, palpitations, leg swelling  GI: denies abdominal pain, nausea, vomiting, diarrhea, constipation, hematochezia, worsening heartburn or stomach ulcers  : denies dysuria, hematuria, incontinence, increased frequency, urgency, difficulty urinating  MUSCULOSKELETAL: denies musculoskeletal complaints other than in HPI  NEURO: denies numbness, tingling, weakness, balance issues, dizziness, memory loss  PSYCHIATRIC: denies Hx of depression, anxiety, other psychiatric disorders  HEMATOLOGIC: denies blood clots, anemia, blood clotting disorders, blood transfusion  ENDOCRINE: denies autoimmune disease, thyroid issues, or diabetes  ALLERGY: denies asthma, seasonal allergies    Physical Examination:    There were no vitals taken for this visit.    Physical Exam  MUSCULOSKELETAL:     Constitutional: appears well hydrated, alert and responsive, no acute distress noted      LEFT Shoulder Exam:    Full active and passive range of motion with excellent contour of chest wall. 1x1cm area of wound dehiscence over axilla wall. Small area of fibrinous debris. No purulent drainage, discharge      Imaging:     Results          Imaging was independently reviewed and interpreted by attending physician  No results found.     Labs:  No results found for: \"WBC\", \"HGB\", \"PLT\"   No results found for: \"GLU\", \"BUN\", \"CREATSERUM\", \"GFR\", \"GFRNAA\", \"GFRAA\"     Assessment and Plan:  Assessment & Plan  Non-healing surgical wound  Wound improving but one stitch popped; area thin, complicating healing.  - Refer to plastic surgeon Dr. Romero for evaluation and management.  - Schedule appointment with Dr. Romero this week.  - Advise him to keep phone available for scheduling updates.  - Reassess need for physical therapy post-healing.      There are no diagnoses linked to this encounter.        Follow Up: No follow-ups on  file.          Terry Haynes MD Orthopedic Surgery / Sports Medicine Specialist  [facility]  [address] [place] [address]  [email or web address]    t: [phone] f: [phone]    This note was dictated using Dragon software.  While it was briefly proofread prior to completion, some grammatical, spelling, and word choice errors due to dictation may still occur.       [1]   Past Medical History:   Visual impairment    glasses   [2]   Past Surgical History:  Procedure Laterality Date    Other surgical history Left     elbow fracture    Other surgical history Left 04/01/2025    Pectoralis Tear Left Upper Arm   [3]   Current Outpatient Medications   Medication Sig Dispense Refill    Naloxone HCl 4 MG/0.1ML Nasal Liquid 4 mg by Nasal route as needed. If patient remains unresponsive, repeat dose in other nostril 2-5 minutes after first dose. (Patient not taking: Reported on 6/30/2025) 1 kit 0   [4] No Known Allergies  [5] History reviewed. No pertinent family history.

## 2025-07-24 ENCOUNTER — OFFICE VISIT (OUTPATIENT)
Dept: SURGERY | Facility: CLINIC | Age: 25
End: 2025-07-24

## 2025-07-24 DIAGNOSIS — T81.31XA DISRUPTION OF EXTERNAL SURGICAL WOUND, INITIAL ENCOUNTER: Primary | ICD-10-CM

## 2025-07-24 PROCEDURE — 99204 OFFICE O/P NEW MOD 45 MIN: CPT | Performed by: PLASTIC SURGERY

## 2025-07-24 NOTE — H&P
Jean Yarbrough is a 24 year old male that presents with   Chief Complaint   Patient presents with    Wound Care     L AXILLA    .    REFERRED BY:  Terry Haynes      Pacemaker: No  Latex Allergy: no  Coumadin: No  Plavix: No  Other anticoagulants: No  Diet medication: No  Cardiac stents: No    HAND DOMINANCE:  Right    Profession: Not employeed    RECONSTRUCTIVE HISTORY    SUN EXPOSURE   Current no   Past no   Sunburns no   Tanning salons current no   Tanning salons past no     SKIN CANCER    Personal history of skin cancer: none      HPI:       Surgery 1: DOWNEY: Left pectoralis repair L  - Date: 04/01/25  - Days Since: 114    Surgery 2: SHAYAN: L arm I&D  - Date: 07/02/25  - Days Since: 22     24-year-old with a left axillary wound    Had a pectoralis repair with wound dehiscence    Incision and drainage 7/2 7/16  2 sutures placed    Minimal drainage, but he drains onto the gauze pad           Review of Systems:   Constitutional: No change in appetite, chill/rigors, or fatigue  GI: No jaundice  Endocrine: No generalized weakness  Neurological: No aphasia, loss of consciousness, or seizures      Integumentary:     WOUND     Duration 4/1/25    Location Left axilla    Mechanism S/p left pectoral surgery    Pain  no    Treatment I&D 7/2/25    Local care Washing with PH wipe, hydrogen peroxide and gauze    2 sutures placed 7/16 in the office      PMH:     MEDICAL  Past Medical History[1]     SURGICAL  Past Surgical History[2]     ALLERIGIES  Allergies[3]     MEDICATIONS  Current Medications[4]     SOCIAL HISTORY  Short Social Hx on File[5]     FAMILY HISTORY  Family History[6]       PHYSICAL EXAM:     CONSTITUTIONAL: Overall appearance - Normal  HEENT: Normocephalic  EYES: Conjunctiva - Right: Normal, Left: Normal; EOMI  EARS: Inspection - Right: Normal, Left: Normal  NECK/THYROID: Inspection - Normal, Palpation - Normal, Thyroid gland - Normal, No adenopathy  RESPIRATORY: Inspection - Normal, Effort -  Normal  CARDIOVASCULAR: Regular rhythm, No murmurs  ABDOMEN: Inspection - Normal, No abdominal tenderness  NEURO: Memory intact  PSYCH: Oriented to person, place, time, and situation, Appropriate mood and affect      Physical Exam:       Left shoulder pink scar, no infection    Inferior to this is a 1 cm opening with 2 nylon sutures  No gross infection  Minimal serous drainage    I tried to extrude drainage and could only extruded 2 drops  No infection    X-ray independently interpreted: No fracture or dislocation    ASSESSMENT/PLAN:       WOUND(S) left axilla post pectoralis repair      We had a long discussion.   I explained to the patient the nature of the wound(s) and we had a long discussion regarding management of this wound(s).  I explained treatment options and answered the patient's questions.  The patient wishes to proceed with wound management.  If this heals, the area will have a permanent scar.      There may be an underlying bursa, but I would expect more drainage.    Will treat with local care.  If this does not heal, would recommend surgical excision of all involved tissue and closure    TREATMENT:      Daily shower  Wash with soap and water  Polysporin, gauze, Spandage    1 week              7/24/2025  Murtaza Black MD          +++++++++++++++++++++++++++++++++++++++++      MEDICAL DECISION MAKING    PROBLEMS      MODERATE    (number / complexity)          Acute complicated injury    DATA         MODERATE    (amount / complexity)          X-rays independently reviewed    MANAGEMENT RISK  LOW    (complications/ morbidity)       Local care, Polysporin                  MDM LEVEL    MODERATE                [1]   Past Medical History:   Visual impairment    glasses   [2]   Past Surgical History:  Procedure Laterality Date    Other surgical history Left     elbow fracture    Other surgical history Left 04/01/2025    Pectoralis Tear Left Upper Arm   [3] No Known Allergies  [4]   Current Outpatient  Medications   Medication Sig Dispense Refill    Naloxone HCl 4 MG/0.1ML Nasal Liquid 4 mg by Nasal route as needed. If patient remains unresponsive, repeat dose in other nostril 2-5 minutes after first dose. (Patient not taking: Reported on 7/24/2025) 1 kit 0   [5]   Social History  Socioeconomic History    Marital status: Single   Tobacco Use    Smoking status: Never     Passive exposure: Never    Smokeless tobacco: Never   Vaping Use    Vaping status: Never Used   Substance and Sexual Activity    Alcohol use: Not Currently     Comment: socially    Drug use: Never   Other Topics Concern    Caffeine Concern Yes    Exercise Yes    Right Handed Yes   [6] No family history on file.

## 2025-07-31 ENCOUNTER — OFFICE VISIT (OUTPATIENT)
Dept: SURGERY | Facility: CLINIC | Age: 25
End: 2025-07-31

## 2025-07-31 DIAGNOSIS — T81.31XA DISRUPTION OF EXTERNAL SURGICAL WOUND, INITIAL ENCOUNTER: Primary | ICD-10-CM

## 2025-07-31 PROCEDURE — 99213 OFFICE O/P EST LOW 20 MIN: CPT | Performed by: PLASTIC SURGERY

## 2025-08-07 ENCOUNTER — OFFICE VISIT (OUTPATIENT)
Dept: SURGERY | Facility: CLINIC | Age: 25
End: 2025-08-07

## 2025-08-07 DIAGNOSIS — T81.31XA DISRUPTION OF EXTERNAL SURGICAL WOUND, INITIAL ENCOUNTER: Primary | ICD-10-CM

## 2025-08-07 PROCEDURE — 99213 OFFICE O/P EST LOW 20 MIN: CPT | Performed by: PLASTIC SURGERY

## 2025-08-14 ENCOUNTER — OFFICE VISIT (OUTPATIENT)
Dept: SURGERY | Facility: CLINIC | Age: 25
End: 2025-08-14

## 2025-08-14 DIAGNOSIS — T81.31XA DISRUPTION OF EXTERNAL SURGICAL WOUND, INITIAL ENCOUNTER: Primary | ICD-10-CM

## 2025-08-14 PROCEDURE — 99213 OFFICE O/P EST LOW 20 MIN: CPT | Performed by: PLASTIC SURGERY

## 2025-08-21 ENCOUNTER — OFFICE VISIT (OUTPATIENT)
Dept: SURGERY | Facility: CLINIC | Age: 25
End: 2025-08-21

## 2025-08-21 DIAGNOSIS — T81.31XA DISRUPTION OF EXTERNAL SURGICAL WOUND, INITIAL ENCOUNTER: Primary | ICD-10-CM

## 2025-08-21 PROCEDURE — 99214 OFFICE O/P EST MOD 30 MIN: CPT | Performed by: PLASTIC SURGERY

## (undated) DEVICE — SUT FBRWR 2 38IN N ABSRB BLU L36.6MM 1/2

## (undated) DEVICE — 3M™ COBAN™ NL STERILE NON-LATEX SELF-ADHERENT WRAP, 2084S, 4 IN X 5 YD (10 CM X 4,5 M), 18 ROLLS/CASE: Brand: 3M™ COBAN™

## (undated) DEVICE — APPLICATOR PREP 26ML CHG 2% ISO ALC 70%

## (undated) DEVICE — SUT COAT VCRL 0 27IN CP-1 ABSRB UD 36MM 1/2

## (undated) DEVICE — COTTON UNDERCAST PADDING,REGULAR FINISH: Brand: WEBRIL

## (undated) DEVICE — SPONGE LAP 18X18IN WHT COT 4 PLY FLD STRUNG

## (undated) DEVICE — SHEET,DRAPE,53X77,STERILE: Brand: MEDLINE

## (undated) DEVICE — Device

## (undated) DEVICE — C-ARM: Brand: UNBRANDED

## (undated) DEVICE — INTENT TO BE USED WITH SUTURE MATERIAL FOR TISSUE CLOSURE: Brand: RICHARD-ALLAN® NEEDLE 3/8 CIRCLE TROCAR

## (undated) DEVICE — ANTIBACTERIAL UNDYED BRAIDED (POLYGLACTIN 910), SYNTHETIC ABSORBABLE SUTURE: Brand: COATED VICRYL

## (undated) DEVICE — SPONGE,LAP,18"X18",STD,XR,ST,5/PK,40PK/C: Brand: MEDLINE

## (undated) DEVICE — DRESSING HYDRCOLL 3.25X6IN TRNSLUC

## (undated) DEVICE — SUT ETHBND XL 2 30IN V-37 NABSRB GRN 40MM 1/2

## (undated) DEVICE — KIT BEACH CHR FOAM W/ SUPP ARM DRP

## (undated) DEVICE — GLOVE SUR 8 SENSICARE PI MIC PIP CRM PWD F

## (undated) DEVICE — SHOULDER P.A.D II: Brand: DEROYAL

## (undated) DEVICE — GAMMEX® PI HYBRID SIZE 7.5, STERILE POWDER-FREE SURGICAL GLOVE, POLYISOPRENE AND NEOPRENE BLEND: Brand: GAMMEX

## (undated) DEVICE — COVER,MAYO STAND,STERILE: Brand: MEDLINE

## (undated) DEVICE — TOWEL,OR,DSP,ST,BLUE,DLX,1/PK,80PK/CS: Brand: MEDLINE

## (undated) DEVICE — SPLINT ONESTEP 4X30IN FBRGLS MOLD

## (undated) DEVICE — PAD,NON-ADHERENT,3X8,STERILE,LF,1/PK: Brand: CURAD

## (undated) DEVICE — BANDAGE,GAUZE,BULKEE II,4.5"X4.1YD,STRL: Brand: MEDLINE

## (undated) DEVICE — SUT MCRYL 3-0 27IN ABSRB UD L24MM PS-1

## (undated) DEVICE — COVER XR CASS W21XL40IN UNIV ADH MICROSHIELD

## (undated) DEVICE — DRAPE,U/ SHT,SPLIT,PLAS,STERIL: Brand: MEDLINE

## (undated) DEVICE — EXOFIN PRECISION PEN HIGH VISCOSITY TOPICAL SKIN ADHESIVE: Brand: EXOFIN PRECISION PEN, 1G

## (undated) DEVICE — ADHESIVE SKIN TOP FOR WND CLSR DERMBND ADV

## (undated) DEVICE — PACK CDS UPPER EXTREMITY

## (undated) DEVICE — PACK CDS SHOULDER

## (undated) DEVICE — GAUZE,SPONGE,USP,4"X4",12PLY,STRL,10/PK: Brand: MEDLINE INDUSTRIES, INC.

## (undated) DEVICE — BNDG,ELSTC,MATRIX,STRL,6"X5YD,LF,HOOK&LP: Brand: MEDLINE

## (undated) DEVICE — TOWEL,OR,DSP,ST,BLUE,DLX,2/PK,40PK/CS: Brand: MEDLINE

## (undated) DEVICE — 3M™ IOBAN™ 2 ANTIMICROBIAL INCISE DRAPE 6650EZ: Brand: IOBAN™ 2

## (undated) DEVICE — 3M™ TEGADERM™ TRANSPARENT FILM DRESSING FRAME STYLE, 1626W, 4 IN X 4-3/4 IN (10 CM X 12 CM), 50/CT 4CT/CASE: Brand: 3M™ TEGADERM™

## (undated) DEVICE — INTENDED FOR TISSUE SEPARATION, AND OTHER PROCEDURES THAT REQUIRE A SHARP SURGICAL BLADE TO PUNCTURE OR CUT.: Brand: BARD-PARKER ® STAINLESS STEEL BLADES

## (undated) DEVICE — SOLUTION IRRIG 1000ML 0.9% NACL USP BTL

## (undated) DEVICE — GAMMEX® PI HYBRID SIZE 7, STERILE POWDER-FREE SURGICAL GLOVE, POLYISOPRENE AND NEOPRENE BLEND: Brand: GAMMEX

## (undated) DEVICE — GLOVE SUR 7 SENSICARE PI PIP GRN PWD F

## (undated) DEVICE — BEACH CHAIR MASK SGL USE

## (undated) DEVICE — GLOVE SUR 7.5 SENSICARE PI PIP CRM PWD F

## (undated) DEVICE — SHOULDER STABILIZATION KIT,                                    DISPOSABLE 12 PER BOX

## (undated) DEVICE — GLOVE SUR 8.5 SENSICARE PI PIP GRN PWD F

## (undated) NOTE — Clinical Note
AUTHORIZATION FOR SURGICAL OPERATION OR OTHER PROCEDURE    1. I hereby authorize  {[facility] PM&R Phys:[id number]} and the [facility] staff assigned to my case to perform the following operation and/or procedure at the [facility] Office:    _______________________________________________________________________________________________      _______________________________________________________________________________________________    2.  My physician has explained the nature and purpose of the operation or other procedure, possible alternative methods of treatment, the risks involved, and the possibility of complication to me.  I acknowledge that no guarantee has been made as to the result that may be obtained.  3.  I recognize that, during the course of this operation, or other procedure, unforseen conditions may necessitate additional or different procedure than those listed above.  I, therefore, further authorize and request that the above named physician, his/her physician assistants or designees perform such procedures as are, in his/her professional opinion, necessary and desirable.  4.  Any tissue or organs removed in the operation or other procedure may be disposed of by and at the discretion of the [facility] staff and [facility].  5.  I understand that in the event of a medical emergency, I will be transported by local paramedics to [facility] or other hospital emergency department.  6.  I certify that I have read and fully understand the above consent to operation and/or other procedure.    7.  I acknowledge that my physician has explained sedation/analgesia administration to me including the risks and benefits.  I consent to the administration of sedation/analgesia as may be necessary or desirable in the judgement of my physician.    Witness signature: ___________________________________________________ Date:  ______/______/_____                    Time:  ________ A.M.  P.M.        Patient Name:  ______________________________________________________  (please print)       Patient signature:  ___________________________________________________             Relationship to Patient:           []  Parent    Responsible person                          []  Spouse  In case of minor or                    [] Other  _____________   Incompetent name:  __________________________________________________                               (please print)      _____________      Responsible person  In case of minor or  Incompetent signature:  _______________________________________________    Statement of Physician  My signature below affirms that prior to the time of the procedure, I have explained to the patient and/or his/her guardian, the risks and benefits involved in the proposed treatment and any reasonable alternative to the proposed treatment.  I have also explained the risks and benefits involved in the refusal of the proposed treatment and have answered the patient's questions.                        Date:  ______/______/_______  Provider                      Signature:  __________________________________________________________       Time:  ___________ A.M    P.M.

## (undated) NOTE — LETTER
[facility], [address], [place]  [address] [facility] [phone]             [address] [facility]         Patient Name:   Nickey Gaucher, ([id number])   Sex: male  : 2000       Order Date:  2023  Authorizing Provider:   Margareth oCrtez     Procedure:  PHYSICAL THERAPY - INTERNAL [[id number]]         Order #:   [id number]  Qty:  1     Priority:  Routine                   Class:   [facility] - RFL     Standing Interval:          Standing Occurrences:          Expires on:            Expected by:    Associated DX:  Lumbar radiculopathy (M54.16)  Lumbar disc disease (M51.9)  Lumbar herniated disc (M51.26)  Spinal stenosis of lumbar region without neurogenic claudication (M48.061)     Order summary:  [facility] - RFL, Routine, Referral By Corinna ANDUJAR 1 visit  Physical Therapy Area of Concentration: Ortho  Physical Therapy Ortho: Spine         Comments:  left L5 radiculopathy  (primary encounter diagnosis)  L3-4 left slight-mild bulging disc  L4-5 right mild-moderate lateral recess herniated disc  L4-5 right moderate lateral recess & right mild foraminal stenosis     2-6 sessions  Lumbar Leslie protocol with neural mobilization and advance to stabilization. Emphasis on upgrading his HEP. Scheduling Instructions:  Note to Managed Care Patients: This is the physician order form only and not an authorization for services. The [facility] refers patients to have physical therapy done at [facility], however, your insurance company may require you to have these services done at another facility or to obtain prior authorization. Unauthorized care may be your financial responsibility. If you have questions, please call your plan's customer service number located on your ID card.        Electronically Signed By: Michelle Huang MD    Order Date: May 16, 2023 at 10:09 AM

## (undated) NOTE — LETTER
AUTHORIZATION FOR SURGICAL OPERATION OR OTHER PROCEDURE    1. I hereby authorize Terry Reynoso  and [facility] staff assigned to my case to perform the following operation and/or procedure at the [facility] site:    Suture on the Left arm   _______________________________________________________________________________________________      _______________________________________________________________________________________________    2.  My physician has explained the nature and purpose of the operation or other procedure, possible alternative methods of treatment, the risks involved, and the possibility of complication to me.  I acknowledge that no guarantee has been made as to the result that may be obtained.  3.  I recognize that, during the course of this operation, or other procedure, unforseen conditions may necessitate additional or different procedure than those listed above.  I, therefore, further authorize and request that the above named physician, his/her physician assistants or designees perform such procedures as are, in his/her professional opinion, necessary and desirable.  4.  Any tissue or organs removed in the operation or other procedure may be disposed of by and at the discretion of the [facility] and [facility].  5.  I understand that in the event of a medical emergency, I will be transported by local paramedics to [facility] or other hospital emergency department.  6.  I certify that I have read and fully understand the above consent to operation and/or other procedure.    7.  I acknowledge that my physician has explained sedation/analgesia administration to me including the risks and benefits.  I consent to the administration of sedation/analgesia as may be necessary or desirable in the judgement of my physician.    Witness signature: ___________________________________________________ Date:   ______/______/_____                    Time:  ________ A.M.  P.M.       Patient Name:  ______________________________________________________  (please print)      Patient signature:  ___________________________________________________             Relationship to Patient:           []  Parent    Responsible person                          []  Spouse  In case of minor or                    [] Other  _____________   Incompetent name:  __________________________________________________                               (please print)      _____________      Responsible person  In case of minor or  Incompetent signature:  _______________________________________________    Statement of Physician  My signature below affirms that prior to the time of the procedure, I have explained to the patient and/or his/her guardian, the risks and benefits involved in the proposed treatment and any reasonable alternative to the proposed treatment.  I have also explained the risks and benefits involved in the refusal of the proposed treatment and have answered the patient's questions.                        Date:  ______/______/_______  Provider                      Signature:  __________________________________________________________       Time:  ___________ A.M    P.M.

## (undated) NOTE — LETTER
25      Patient: Jean Yarbrough  : 2000 Visit date: 2025    Dear Trev,      I examined your patient in consultation today.    He has a wound of the left axilla which is not grossly infected.  Drainage is decreasing, but he may still have an underlying bursa.    Will treat with local care, and I will reevaluate him in 1 week.  If he continues to drain, I would recommend excision of all involved tissue send closure.    Thank you for your kind referral. If I may answer any questions, please feel free to contact me.     Sincerely,   Murtaza Black MD     CC: No Recipients